# Patient Record
Sex: FEMALE | Race: WHITE | Employment: FULL TIME | URBAN - METROPOLITAN AREA
[De-identification: names, ages, dates, MRNs, and addresses within clinical notes are randomized per-mention and may not be internally consistent; named-entity substitution may affect disease eponyms.]

---

## 2021-07-08 ENCOUNTER — HOSPITAL ENCOUNTER (OUTPATIENT)
Age: 26
Discharge: HOME | End: 2021-07-08
Attending: OBSTETRICS & GYNECOLOGY | Admitting: OBSTETRICS & GYNECOLOGY
Payer: MEDICAID

## 2021-07-08 VITALS
HEIGHT: 66 IN | SYSTOLIC BLOOD PRESSURE: 115 MMHG | DIASTOLIC BLOOD PRESSURE: 71 MMHG | WEIGHT: 136 LBS | HEART RATE: 89 BPM | TEMPERATURE: 97.9 F | BODY MASS INDEX: 21.86 KG/M2

## 2021-07-08 PROBLEM — O47.00 PRETERM CONTRACTIONS: Status: ACTIVE | Noted: 2021-07-08

## 2021-07-08 LAB
APPEARANCE UR: CLEAR
BILIRUB UR QL: NEGATIVE
COLOR UR: YELLOW
FIBRONECTIN FETAL VAG QL: NEGATIVE
GLUCOSE UR QL STRIP.AUTO: NEGATIVE MG/DL
KETONES UR-MCNC: NEGATIVE MG/DL
LEUKOCYTE ESTERASE UR QL STRIP: NEGATIVE
NITRITE UR QL: NEGATIVE
PH UR: 7 [PH] (ref 5–9)
PROT UR QL: NEGATIVE MG/DL
RBC # UR STRIP: NEGATIVE /UL
SERVICE CMNT-IMP: ABNORMAL
SP GR UR: 1.02 (ref 1–1.02)
UROBILINOGEN UR QL: 0.2 EU/DL (ref 0.2–1)

## 2021-07-08 PROCEDURE — 59025 FETAL NON-STRESS TEST: CPT

## 2021-07-08 PROCEDURE — 81003 URINALYSIS AUTO W/O SCOPE: CPT

## 2021-07-08 PROCEDURE — 82731 ASSAY OF FETAL FIBRONECTIN: CPT

## 2021-07-08 RX ORDER — LANOLIN ALCOHOL/MO/W.PET/CERES
325 CREAM (GRAM) TOPICAL
COMMUNITY

## 2021-07-08 RX ORDER — INULIN/CHOLECALCIFEROL (D3) 2500MG-500
1 TABLET,CHEWABLE ORAL DAILY
COMMUNITY

## 2021-07-08 NOTE — PROGRESS NOTES
8952  @ 29.4 weeks arrived from office with FFN in hand for  ctxs. To bathroom to void, ambulated to bed and connected to EFM. 0 Dr. Pablo Amaral called, reviewed NST, discharge orders received. 1055 Discharge instructions given, pt verbalized understanding.

## 2021-07-30 ENCOUNTER — APPOINTMENT (OUTPATIENT)
Dept: PHYSICAL THERAPY | Age: 26
End: 2021-07-30
Payer: MEDICAID

## 2021-08-09 ENCOUNTER — APPOINTMENT (OUTPATIENT)
Dept: PHYSICAL THERAPY | Age: 26
End: 2021-08-09
Payer: MEDICAID

## 2021-08-10 ENCOUNTER — HOSPITAL ENCOUNTER (OUTPATIENT)
Dept: PHYSICAL THERAPY | Age: 26
Discharge: HOME | End: 2021-08-10
Payer: MEDICAID

## 2021-08-10 PROCEDURE — 97162 PT EVAL MOD COMPLEX 30 MIN: CPT

## 2021-08-10 NOTE — PROGRESS NOTES
PT DAILY TREATMENT NOTE/CERVICAL [id number]    Patient Name: Ike Manrique  Date:8/10/2021  : 1995  [x]  Patient  Verified  Payor: Latoya Branch / Plan: Goldy Spencer / Product Type: Managed Care Medicaid /    In time:4:30  Out time:5:15  Total Treatment Time (min): 45  Visit #: 1 of 12    Treatment Area: Neck pain [M54.2]  Right shoulder pain [M25.511]  Left shoulder pain [M25.512]    SUBJECTIVE  Pain Level (0-10 scale): 2  [x]constant []intermittent []improving []worsening []no change since onset    Any medication changes, allergies to medications, adverse drug reactions, diagnosis change, or new procedure performed?: [x] No    [] Yes (see summary sheet for update)  Subjective functional status/changes:     PLOF: spending time with family  Mechanism of Injury: 2 years with insidious onset. Pain in bilateral neck and shoulders. Current symptoms/Complaints: Describes pain as tender to touch. Denies numbness and tingling. 35 weeks 2nd baby, Emergency  with first with first baby 10mo. . Pain increases with touch, moving 8/10. Pain decreases to 2/10 propping up with pillows. Pain has increased some in third trimester. HA few times a week, sometimes due to pain. Previous Treatment/Compliance: went to therapy in the past with some relief, had to stop due to insurance. 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 \"    OBJECTIVE/EXAMINATION    21 min [x]Eval                  []Re-Eval        5 min Therapeutic Exercise:  [] See flow sheet : scap squeeze, QP cat/cow, standing IOTA, triceps extension with TB   Rationale: increase ROM, increase strength and increase proprioception to improve the patients ability to perform daily activities with decreased pain and symptom levels    7 min Therapeutic Activity:  []  See flow sheet : pt education on exam findings, diagnosis, posture, SI belt to help with pelvic pain   Rationale: increase strength, improve coordination and increase proprioception  to improve the patients ability to perform daily activities with decreased pain and symptom levels          With   [] TE   [] TA   [] neuro   [] other: Patient Education: [x] Review HEP    [] Progressed/Changed HEP based on:   [] positioning   [] body mechanics   [] transfers   [] heat/ice application    [] other:      Other Objective/Functional Measures:     OBJECTIVE  Posture: [] WNL  Head Position:  C-Kyphosis:  [] increased   [] decreased   C-Lordosis:   [] increased   [] decreased  T-Kyphosis:  [x] increased   [] decreased  T-Lordosis:   [] increased   [] decreased     Shoulder/Scapular Screen: right scapular wingling laterally       AROM               Strength    Right Left  Right  Left    Shoulder flexion  145* 160* 4 4   Shoulder * 180* 4 4   Biceps    5 5   Triceps    3+ 3+   Shoulder IR 65* 65* 5 5   Shoulder ER 90* 90*  4+ 4+   Rhomboids        Lower trap        Serratus          Active Movements: [] N/A   [] Too acute   [] Other:  Cervical  AROM PROM Comments:pain, area   Forward flexion 45*     Extension 45*     SB right 40*     SB left  40*     Rotation right 85*  Pulling on left    Rotation left 85*       Trunk rotation: right 14in, left 14in    Palpation:  [] Min  [x] Mod  [] Severe    Location: bilateral cervcial paraspinals, UT, subscap, lats, thoracic paraspinals     Neuro Screen (myotome/dematome/felexes): [] WNL  Myotome Level Muscle Test Myotome Level Muscle Test   C5 Shoulder Adduction - Deltoid C8 Finger Flexors   C6 Wrist Extension T1 Finger Abduction - Interossei   C7 Elbow Extension     Comments:  Upper Limb Tension Tests: [] N/A       Ulnar: [] R    [] L    [] +    [] -       Median: [] R    [] L    [] +    [] -       Radial: [] R    [] L    [] +    [] -    Special Tests:  Cervical:        Vertebral Artery:  [] R    [] L    [] +    [] - Alar Ligament: [] R    [] L    [] +    [] -       Spurling's:  [] R    [] L    [] +    [x] -       Distraction:  [] R    [] L    [] +    [] -       Compression: [] R    [] L    [] +    [x] -    Muscle Flexibility: [] N/A   Scalenes: [] WNL    [x] Tight    [x] R    [x] L   Upper Trap: [] WNL    [x] Tight    [x] R    [x] L   Levator: [] WNL    [x] Tight    [x] R    [x] L   Pect. Minor: [] WNL    [x] Tight    [x] R    [x] L         Other tests/comments:  UT compensation with reaching overhead     Pain Level (0-10 scale) post treatment: 4    ASSESSMENT/Changes in Function: Pt is a 21yo female that is 35 weeks pregnant with second child with close pregnancy with first presenting to therapy with c/c bilateral shoulder and neck pain with insidious onset 2 years ago. 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 Pt c/o pain that starts in neck and radiates down to the top of both shoulders however denies radicular symptoms. Pt describes pain as tender to the touch with knots with unable to tolerate massage from  due to pain. Pain increases to 8/10 with increased activity, reaching overhead, turning to right. Pain decreases to 2/10 with sitting with support and rest. Pt demonstrates WFL cervical ROM, WFL shoulder ROM bilaterally in all planes except right shoulder flexion and bilateral IR, WFL trunk rotation ROM, decreased bilateral triceps and scapular strength, negative cervcial compression, Spurlings and shoulder ligament tests, TTP over bilateral cervical and thoracic paraspinals, UT, subscap, lats and pecs, and  increased UT compensation and thoracic extesion with reaching overhead. Signs and symptoms consistent with mechanical neck/shoulder pain. Pt would benefit from skilled PT services to address the above impairments to allow pt to complete ADLs with increased ease.        Patient will continue to benefit from skilled PT services to modify and progress therapeutic interventions, address functional mobility deficits, address ROM deficits, address strength deficits, analyze and address soft tissue restrictions, analyze and cue movement patterns, analyze and modify body mechanics/ergonomics, assess and modify postural abnormalities and instruct in home and community integration to attain remaining goals. []  See Plan of Care  []  See progress note/recertification  []  See Discharge Summary         Progress towards goals / Updated goals:  Short Term Goals: STG- To be accomplished in 2 week(s):  1. Pt will be independent with HEP to encourage prophylaxis. Eval: HEP dispensed     Long Term Goals: LTG- To be accomplished in 6 week(s):  1. Pt will report >/=80% improvement in symptoms to be able to complete ADLs with increased ease. Eval: 8/10 max pain in neck and bilateral shoulder with activity    2. Pt right shoulder flexion ROM will improve to 160* and bilateral IR to > 80* to increase ease with ADLs such as dressing  Eval:145* right shoulder flexion with increased thoracic extension, 65* IR bilaterally    3. Pt bilateral triceps strength will improve to at least 4+/5 to allow pt to complte work duties and carry child with increased ease. Eval:3+/5 bilaterally     4. Pt FOTO score will increase by >/= 4 points to show improvement in subjective function.   Eval:65  Current: will address at visit 5       PLAN  []  Upgrade activities as tolerated     [x]  Continue plan of care  []  Update interventions per flow sheet       []  Discharge due to:_  []  Other:_      Victor Manuel Brock 8/10/2021  4:28 PM

## 2021-08-10 NOTE — PROGRESS NOTES
[facility] at the [address], [place], [address] [facility]  Phone: [phone]      Fax:  [phone]             Plan of Care/ Statement of Necessity for Physical Therapy Services    Patient name: Kayla Griffin Start of Care: 8/10/2021   Referral source: Alyne Nyhan, MD : 1995    Medical Diagnosis: Neck pain [M54.2]  Right shoulder pain [M25.511]  Left shoulder pain [M25.512]   Onset Date:2 years     Treatment Diagnosis: neck and bilateral shoulder pain    Prior Hospitalization: see medical history Provider#: [id number]   Medications: Verified on Patient summary List    Comorbidities: , close pregancies   Prior Level of Function able to complete ADLs and work duties with less pain      The Plan of Care and following information is based on the information from the initial evaluation. Assessment/ key information: Pt is a 21yo female that is 35 weeks pregnant with second child with close pregnancy with first presenting to therapy with c/c bilateral shoulder and neck pain with insidious onset 2 years ago. 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 Pt c/o pain that starts in neck and radiates down to the top of both shoulders however denies radicular symptoms. Pt describes pain as tender to the touch with knots with unable to tolerate massage from  due to pain. Pain increases to 8/10 with increased activity, reaching overhead, turning to right.  Pain decreases to 2/10 with sitting with support and rest. Pt demonstrates WFL cervical ROM, WFL shoulder ROM bilaterally in all planes except right shoulder flexion and bilateral IR, WFL trunk rotation ROM, decreased bilateral triceps and scapular strength, negative cervcial compression, Spurlings and shoulder ligament tests, TTP over bilateral cervical and thoracic paraspinals, UT, subscap, lats and pecs, and  increased UT compensation and thoracic extesion with reaching overhead. Signs and symptoms consistent with mechanical neck/shoulder pain. Pt would benefit from skilled PT services to address the above impairments to allow pt to complete ADLs with increased ease.      Evaluation Complexity History MEDIUM  Complexity : 1-2 comorbidities / personal factors will impact the outcome/ POC ; Examination MEDIUM Complexity : 3 Standardized tests and measures addressing body structure, function, activity limitation and / or participation in recreation  ;Presentation MEDIUM Complexity : Evolving with changing characteristics  ; Clinical Decision Making MEDIUM Complexity : FOTO score of 26-74 FOTO score = an established functional score where 100 = no disability  Overall Complexity Rating: MEDIUM  Problem List: pain affecting function, decrease ROM, decrease strength, impaired gait/ balance, decrease ADL/ functional abilitiies, decrease activity tolerance, decrease flexibility/ joint mobility and decrease transfer abilities   Treatment Plan may include any combination of the following: Therapeutic exercise, Therapeutic activities, Neuromuscular re-education, Physical agent/modality, Gait/balance training, Manual therapy, Patient education, Self Care training and Functional mobility training  Patient / Family readiness to learn indicated by: asking questions, trying to perform skills and interest  Persons(s) to be included in education: patient (P)  Barriers to Learning/Limitations: None  Patient Goal (s): get some relief, not so painful to touch. \"  Patient Self Reported Health Status: fair  Rehabilitation Potential: good    Short Term Goals: STG- To be accomplished in 2 week(s):  1. Pt will be independent with HEP to encourage prophylaxis. Eval: HEP dispensed      Long Term Goals: LTG- To be accomplished in 6 week(s):  1. Pt will report >/=80% improvement in symptoms to be able to complete ADLs with increased ease.    Eval: 8/10 max pain in neck and bilateral shoulder with activity     2. Pt right shoulder flexion ROM will improve to 160* and bilateral IR to > 80* to increase ease with ADLs such as dressing  Eval:145* right shoulder flexion with increased thoracic extension, 65* IR bilaterally     3. Pt bilateral triceps strength will improve to at least 4+/5 to allow pt to complte work duties and carry child with increased ease. Eval:3+/5 bilaterally      4. Pt FOTO score will increase by >/= 4 points to show improvement in subjective function. Eval:65  Current: will address at visit 5    Frequency / Duration: Patient to be seen 2 times per week for 6 weeks. Patient/ Caregiver education and instruction: Diagnosis, prognosis, self care, activity modification and exercises   [x]  Plan of care has been reviewed with LEROY CAUSEY Remesic 8/10/2021 6:30 PM  _____________________________________________________________________  I certify that the above Therapy Services are being furnished while the patient is under my care. I agree with the treatment plan and certify that this therapy is necessary.     Physician's Signature:____________Date:_________TIME:________                                      Valerie Oviedo MD    ** Signature, Date and Time must be completed for valid certification **    Please sign and return to [facility] at the [address], [address] [facility]       Phone: [phone]      Fax:  [phone]

## 2021-08-16 ENCOUNTER — HOSPITAL ENCOUNTER (OUTPATIENT)
Dept: PHYSICAL THERAPY | Age: 26
Discharge: HOME | End: 2021-08-16
Payer: MEDICAID

## 2021-08-16 PROCEDURE — 97110 THERAPEUTIC EXERCISES: CPT

## 2021-08-16 PROCEDURE — 97530 THERAPEUTIC ACTIVITIES: CPT

## 2021-08-16 PROCEDURE — 97112 NEUROMUSCULAR REEDUCATION: CPT

## 2021-08-16 NOTE — PROGRESS NOTES
PT DAILY TREATMENT NOTE    Patient Name: Angelia Serna  Date:2021  : 1995  [x]  Patient  Verified  Payor: Vergia Bence / Plan: Alba Chang / Product Type: Managed Care Medicaid /    In time:5:55  Out time:6:40  Total Treatment Time (min): 45  Total Timed Codes (min): 45  1:1 Treatment Time ( [address] only): 45   Visit #: 2 of 12    Treatment Dx: Neck pain [M54.2]  Right shoulder pain [M25.511]  Left shoulder pain [M25.512]    SUBJECTIVE  Pain Level (0-10 scale): 4  Any medication changes, allergies to medications, adverse drug reactions, diagnosis change, or new procedure performed?: [x] No    [] Yes (see summary sheet for update)  Subjective functional status/changes:   [] No changes reported  Pt reporting increased pain today. Pt reporting sorry she missed last appointment with thinking it was Friday instead of Thursday. OBJECTIVE      27 min Therapeutic Exercise:  [x] See flow sheet :   Rationale: increase ROM and increase strength to improve the patients ability to perform daily activities with decreased pain and symptom levels    8 min Therapeutic Activity:  []  See flow sheet : pt education on posture, SI belt use, PPT   Rationale: increase ROM, increase strength and increase proprioception  to improve the patients ability to perform daily activities with decreased pain and symptom levels    10 min Neuromuscular Re-education:  [x]?   See flow sheet : STM to left levator with breathing reclined supine   Rationale: improve coordination, improve balance and increase proprioception  to improve the patients ability to perform daily activities with decreased pain and symptom levels       With   [] TE   [] TA   [] neuro   [] other: Patient Education: [x] Review HEP    [] Progressed/Changed HEP based on:   [] positioning   [] body mechanics   [] transfers   [] heat/ice application    [] other:      Other Objective/Functional Measures:   Consistent cues for PPT with exercises   TTP left levator Pain Level (0-10 scale) post treatment: 2    ASSESSMENT/Changes in Function: Pt tolerated session well with reporting decreased pain post session. Pt tolerated exercises well with modifying as appropriate with good response to standing right reach with given as HEP today. Pt very challenged with PPT in standing and supine reclined needing tactile cues to assist.      Patient will continue to benefit from skilled PT services to modify and progress therapeutic interventions, address functional mobility deficits, address ROM deficits, address strength deficits, analyze and address soft tissue restrictions, analyze and cue movement patterns, analyze and modify body mechanics/ergonomics, assess and modify postural abnormalities and instruct in home and community integration to attain remaining goals. [x]  See Plan of Care  []  See progress note/recertification  []  See Discharge Summary         Progress towards goals / Updated goals:  Short Term Goals: STG- To be accomplished in 2 week(s):  1.  Pt will be independent with HEP to encourage prophylaxis. Eval: HEP dispensed   Current: compliance, updated today progressing 8/16/21     Long Term Goals: LTG- To be accomplished in 6 week(s):  1.  Pt will report >/=80% improvement in symptoms to be able to complete ADLs with increased ease. Eval: 8/10 max pain in neck and bilateral shoulder with activity     2.  Pt right shoulder flexion ROM will improve to 160* and bilateral IR to > 80* to increase ease with ADLs such as dressing  Eval:145* right shoulder flexion with increased thoracic extension, 65* IR bilaterally  Current:      3.  Pt bilateral triceps strength will improve to at least 4+/5 to allow pt to complte work duties and carry child with increased ease. Eval:3+/5 bilaterally      4.  Pt FOTO score will increase by >/= 4 points to show improvement in subjective function.   Eval:65  Current: will address at visit 5    PLAN  []  Upgrade activities as tolerated     [x]  Continue plan of care  []  Update interventions per flow sheet       []  Discharge due to:_  []  Other:_      Roc Yu 8/16/2021  6:11 PM    Future Appointments   Date Time Provider [place]   8/19/2021  6:00 PM Won Khoury PT, DPT [facility]   8/23/2021  6:00 PM RemtanvicBoston Pinch [facility]   8/25/2021  6:00 PM Yana Luna PT [facility]   8/30/2021  6:00 PM Remtanvic[place] Pinch [facility]   9/2/2021  6:00 PM Won Khoury PT, DPT [facility]   9/13/2021  6:00 PM Boston Soliz Pinch [facility]   9/16/2021  6:00 PM Won Khoury PT, DPT [facility]

## 2021-08-19 ENCOUNTER — HOSPITAL ENCOUNTER (OUTPATIENT)
Dept: PHYSICAL THERAPY | Age: 26
Discharge: HOME | End: 2021-08-19
Payer: MEDICAID

## 2021-08-19 PROCEDURE — 97110 THERAPEUTIC EXERCISES: CPT

## 2021-08-19 PROCEDURE — 97112 NEUROMUSCULAR REEDUCATION: CPT

## 2021-08-19 PROCEDURE — 97530 THERAPEUTIC ACTIVITIES: CPT

## 2021-08-19 NOTE — PROGRESS NOTES
PT DAILY TREATMENT NOTE    Patient Name: Ike Manrique  Date:2021  : 1995  [x]  Patient  Verified  Payor: Latoya Branch / Plan: Goldy Spencer / Product Type: Managed Care Medicaid /    In time:6:00 pm   Out time:6:49 pm   Total Treatment Time (min): 49  Total Timed Codes (min): 49  Visit #: 3 of 12    Treatment Dx: Neck pain [M54.2]  Right shoulder pain [M25.511]  Left shoulder pain [M25.512]    SUBJECTIVE  Pain Level (0-10 scale): 2  Any medication changes, allergies to medications, adverse drug reactions, diagnosis change, or new procedure performed?: [x] No    [] Yes (see summary sheet for update)  Subjective functional status/changes:   [] No changes reported  \"A lot better. \"    OBJECTIVE      15 min Therapeutic Exercise:  [x] See flow sheet :   Rationale: increase ROM, increase strength, improve coordination and increase proprioception to improve the patients ability to perform daily activities with decreased pain and symptom levels      14 min Therapeutic Activity:  [x]  See flow sheet :   Rationale: increase ROM, increase strength, improve coordination and increase proprioception  to improve the patients ability to perform daily activities with decreased pain and symptom levels       20 min Neuromuscular Re-education:  [x]  See flow sheet : included manual to SCMs and LS with T8 Extension    Rationale: increase ROM, increase strength, improve coordination and increase proprioception  to improve the patients ability to perform daily activities with decreased pain and symptom levels            With   [] TE   [] TA   [] neuro   [] other: Patient Education: [x] Review HEP    [] Progressed/Changed HEP based on:   [] positioning   [] body mechanics   [] transfers   [] heat/ice application    [] other:      Other Objective/Functional Measures:   Noted increased thickening at bilateral SCMs   Challenged with maintaining PPT with T8 Extension and pullovers      Pain Level (0-10 scale) post treatment: 2    ASSESSMENT/Changes in Function:   Pt appeared to respond well to new interventions this session. Challenged with T8 extension and pullovers. Reports that has scheduled  at 39 weeks. Patient will continue to benefit from skilled PT services to modify and progress therapeutic interventions, address functional mobility deficits, address ROM deficits, address strength deficits, analyze and address soft tissue restrictions, analyze and cue movement patterns, analyze and modify body mechanics/ergonomics, assess and modify postural abnormalities and instruct in home and community integration to attain remaining goals. [x]  See Plan of Care  []  See progress note/recertification  []  See Discharge Summary         Progress towards goals / Updated goals:  Short Term Goals: STG- To be accomplished in 2 week(s):  1.  Pt will be independent with HEP to encourage prophylaxis. Eval: HEP dispensed   Current: compliance, updated today progressing 21     Long Term Goals: LTG- To be accomplished in 6 week(s):  1.  Pt will report >/=80% improvement in symptoms to be able to complete ADLs with increased ease. Eval: 8/10 max pain in neck and bilateral shoulder with activity     2.  Pt right shoulder flexion ROM will improve to 160* and bilateral IR to > 80* to increase ease with ADLs such as dressing  Eval:145* right shoulder flexion with increased thoracic extension, 65* IR bilaterally  Current:      3.  Pt bilateral triceps strength will improve to at least 4+/5 to allow pt to complte work duties and carry child with increased ease. Eval:3+/5 bilaterally   Current: Progressing 21 visible fatigue  ER, Pullovers      4.  Pt FOTO score will increase by >/= 4 points to show improvement in subjective function.   Eval:65  Current: will address at visit 5    PLAN  [x]  Upgrade activities as tolerated     []  Continue plan of care  []  Update interventions per flow sheet       []  Discharge due to:_  []  Other:_      Ángela Paris PT, DPT 8/19/2021  6:27 PM    Future Appointments   Date Time Provider [place]   8/23/2021  6:00 PM Eyal Soliz [facility]   8/25/2021  6:00 PM Natacha Carrera PT [facility]   8/30/2021  6:00 PM Eyal Soliz [facility]   9/2/2021  6:00 PM Paulina Dominguez, PT, DPT [facility]   9/13/2021  6:00 PM Eyal Soliz [facility]   9/16/2021  6:00 PM Paulina Dominguez PT, DPT [facility]

## 2021-08-23 ENCOUNTER — APPOINTMENT (OUTPATIENT)
Dept: PHYSICAL THERAPY | Age: 26
End: 2021-08-23
Payer: MEDICAID

## 2021-08-25 ENCOUNTER — APPOINTMENT (OUTPATIENT)
Dept: PHYSICAL THERAPY | Age: 26
End: 2021-08-25
Payer: MEDICAID

## 2021-08-30 ENCOUNTER — HOSPITAL ENCOUNTER (OUTPATIENT)
Dept: PHYSICAL THERAPY | Age: 26
Discharge: HOME | End: 2021-08-30
Payer: MEDICAID

## 2021-08-30 PROCEDURE — 97112 NEUROMUSCULAR REEDUCATION: CPT

## 2021-08-30 PROCEDURE — 97530 THERAPEUTIC ACTIVITIES: CPT

## 2021-08-30 PROCEDURE — 97110 THERAPEUTIC EXERCISES: CPT

## 2021-08-30 NOTE — PROGRESS NOTES
PT DAILY TREATMENT NOTE    Patient Name: Sheng Goddard  Date:2021  : 1995  [x]  Patient  Verified  Payor: Terese Grier / Plan: [facility] MEDICAID / Product Type: Managed Care Medicaid /    In time:6:00  Out time:653  Total Treatment Time (min): 53  Total Timed Codes (min): 53  1:1 Treatment Time ( only): 48   Visit #: 4 of 12    Treatment Dx: Neck pain [M54.2]  Right shoulder pain [M25.511]  Left shoulder pain [M25.512]    SUBJECTIVE  Pain Level (0-10 scale): 5  Any medication changes, allergies to medications, adverse drug reactions, diagnosis change, or new procedure performed?: [x] No    [] Yes (see summary sheet for update)  Subjective functional status/changes:   [] No changes reported  Pt reporting some increased soreness in left shoulder and neck today. Pt reporting seeing OBGYN this Wednesday to schedule  possibly next week. OBJECTIVE    23 min Therapeutic Exercise:  [x]? See flow sheet :   Rationale: increase ROM, increase strength, improve coordination and increase proprioception to improve the patients ability to perform daily activities with decreased pain and symptom levels        15 min Therapeutic Activity:  [x]? See flow sheet :   Rationale: increase ROM, increase strength, improve coordination and increase proprioception  to improve the patients ability to perform daily activities with decreased pain and symptom levels        15 min Neuromuscular Re-education:  [x]?   See flow sheet : included manual to SCMs and LS with T8 Extension    Rationale: increase ROM, increase strength, improve coordination and increase proprioception  to improve the patients ability to perform daily activities with decreased pain and symptom levels          With   [] TE   [] TA   [] neuro   [] other: Patient Education: [x] Review HEP    [] Progressed/Changed HEP based on:   [] positioning   [] body mechanics   [] transfers   [] heat/ice application    [] other:      Other Objective/Functional Measures:    75* left  Shoulder IR ROM , 70* right IR, 152* shoulder flexion right     Pain Level (0-10 scale) post treatment: 2    ASSESSMENT/Changes in Function: Pt tolerated session well with reporting decreased pain post session. Pt continues to demonstrate forward head posture with cues to correct. Pt fatigued with wall slides and s/l ER due to decreased scapular strength. Improved bilateral shoulder ROM noted today suggesting improved rib mobility. Patient will continue to benefit from skilled PT services to modify and progress therapeutic interventions, address functional mobility deficits, address ROM deficits, address strength deficits, analyze and address soft tissue restrictions, analyze and cue movement patterns, analyze and modify body mechanics/ergonomics, assess and modify postural abnormalities and instruct in home and community integration to attain remaining goals. [x]  See Plan of Care  []  See progress note/recertification  []  See Discharge Summary         Progress towards goals / Updated goals:  Short Term Goals: STG- To be accomplished in 2 week(s):  1.  Pt will be independent with HEP to encourage prophylaxis. Eval: HEP dispensed   Current: compliance, updated today progressing 8/16/21     Long Term Goals: LTG- To be accomplished in 6 week(s):  1.  Pt will report >/=80% improvement in symptoms to be able to complete ADLs with increased ease. Eval: 8/10 max pain in neck and bilateral shoulder with activity     2.  Pt right shoulder flexion ROM will improve to 160* and bilateral IR to > 80* to increase ease with ADLs such as dressing  Eval:145* right shoulder flexion with increased thoracic extension, 65* IR bilaterally  Current: 75* left IR, 70* right IR, 152* flexion right progressing well 8/30/21     3.  Pt bilateral triceps strength will improve to at least 4+/5 to allow pt to complte work duties and carry child with increased ease.   Eval:3+/5 bilaterally   Current: Progressing 8/19/21 visible fatigue  ER, Pullovers      4.  Pt FOTO score will increase by >/= 4 points to show improvement in subjective function.   Eval:65  Current: will address at visit 5    PLAN  []  Upgrade activities as tolerated     [x]  Continue plan of care  []  Update interventions per flow sheet       []  Discharge due to:_  []  Other:_      Kizzy Byrd 8/30/2021  6:26 PM    Future Appointments   Date Time Provider Fletcher Barahona   9/2/2021  6:00 PM Susanna Allen, PT, DPT [id number]   9/13/2021  6:00 PM Servando Soliz [facility]   9/16/2021  6:00 PM Susanna Allen PT, DPT [id number]

## 2021-09-02 ENCOUNTER — APPOINTMENT (OUTPATIENT)
Dept: PHYSICAL THERAPY | Age: 26
End: 2021-09-02

## 2021-09-13 ENCOUNTER — TRANSCRIBE ORDER (OUTPATIENT)
Dept: REGISTRATION | Age: 26
End: 2021-09-13

## 2021-09-13 ENCOUNTER — APPOINTMENT (OUTPATIENT)
Dept: PHYSICAL THERAPY | Age: 26
End: 2021-09-13

## 2021-09-13 ENCOUNTER — ANESTHESIA EVENT (OUTPATIENT)
Dept: LABOR AND DELIVERY | Age: 26
DRG: 540 | End: 2021-09-13
Payer: MEDICAID

## 2021-09-13 ENCOUNTER — HOSPITAL ENCOUNTER (OUTPATIENT)
Dept: PREADMISSION TESTING | Age: 26
Discharge: HOME | DRG: 540 | End: 2021-09-13
Payer: MEDICAID

## 2021-09-13 DIAGNOSIS — Z98.891 HISTORY OF CESAREAN SECTION: Primary | ICD-10-CM

## 2021-09-13 LAB
ABO + RH BLD: NORMAL
BASOPHILS # BLD: 0 K/UL (ref 0–0.1)
BASOPHILS NFR BLD: 0 % (ref 0–2)
BLOOD GROUP ANTIBODIES SERPL: NORMAL
DIFFERENTIAL METHOD BLD: ABNORMAL
EOSINOPHIL # BLD: 0 K/UL (ref 0–0.4)
EOSINOPHIL NFR BLD: 0 % (ref 0–5)
ERYTHROCYTE [DISTWIDTH] IN BLOOD BY AUTOMATED COUNT: 13.8 % (ref 11.6–14.5)
HCT VFR BLD AUTO: 30.4 % (ref 35–45)
HGB BLD-MCNC: 9.8 G/DL (ref 12–16)
LYMPHOCYTES # BLD: 1.8 K/UL (ref 0.9–3.6)
LYMPHOCYTES NFR BLD: 20 % (ref 21–52)
MCH RBC QN AUTO: 29.2 PG (ref 24–34)
MCHC RBC AUTO-ENTMCNC: 32.2 G/DL (ref 31–37)
MCV RBC AUTO: 90.5 FL (ref 78–100)
MONOCYTES # BLD: 0.6 K/UL (ref 0.05–1.2)
MONOCYTES NFR BLD: 7 % (ref 3–10)
NEUTS SEG # BLD: 6.6 K/UL (ref 1.8–8)
NEUTS SEG NFR BLD: 73 % (ref 40–73)
PLATELET # BLD AUTO: 137 K/UL (ref 135–420)
PMV BLD AUTO: 11.3 FL (ref 9.2–11.8)
RBC # BLD AUTO: 3.36 M/UL (ref 4.2–5.3)
SPECIMEN EXP DATE BLD: NORMAL
WBC # BLD AUTO: 9.1 K/UL (ref 4.6–13.2)

## 2021-09-13 PROCEDURE — 85025 COMPLETE CBC W/AUTO DIFF WBC: CPT

## 2021-09-13 PROCEDURE — 86901 BLOOD TYPING SEROLOGIC RH(D): CPT

## 2021-09-13 PROCEDURE — 36415 COLL VENOUS BLD VENIPUNCTURE: CPT

## 2021-09-13 PROCEDURE — U0003 INFECTIOUS AGENT DETECTION BY NUCLEIC ACID (DNA OR RNA); SEVERE ACUTE RESPIRATORY SYNDROME CORONAVIRUS 2 (SARS-COV-2) (CORONAVIRUS DISEASE [COVID-19]), AMPLIFIED PROBE TECHNIQUE, MAKING USE OF HIGH THROUGHPUT TECHNOLOGIES AS DESCRIBED BY CMS-2020-01-R: HCPCS

## 2021-09-14 ENCOUNTER — HOSPITAL ENCOUNTER (INPATIENT)
Age: 26
LOS: 2 days | Discharge: HOME | DRG: 540 | End: 2021-09-16
Attending: OBSTETRICS & GYNECOLOGY | Admitting: OBSTETRICS & GYNECOLOGY
Payer: MEDICAID

## 2021-09-14 ENCOUNTER — ANESTHESIA (OUTPATIENT)
Dept: LABOR AND DELIVERY | Age: 26
DRG: 540 | End: 2021-09-14
Payer: MEDICAID

## 2021-09-14 DIAGNOSIS — Z98.891 PREVIOUS CESAREAN SECTION: Primary | ICD-10-CM

## 2021-09-14 PROBLEM — Z3A.39 39 WEEKS GESTATION OF PREGNANCY: Status: ACTIVE | Noted: 2021-09-14

## 2021-09-14 PROBLEM — Z33.1 IUP (INTRAUTERINE PREGNANCY), INCIDENTAL: Status: ACTIVE | Noted: 2021-09-14

## 2021-09-14 LAB — SARS-COV-2, NAA: NOT DETECTED

## 2021-09-14 PROCEDURE — 75410000003 HC RECOV DEL/VAG/CSECN EA 0.5 HR: Performed by: OBSTETRICS & GYNECOLOGY

## 2021-09-14 PROCEDURE — 76010000391 HC C SECN FIRST 1 HR: Performed by: OBSTETRICS & GYNECOLOGY

## 2021-09-14 PROCEDURE — 74011250636 HC RX REV CODE- 250/636: Performed by: ANESTHESIOLOGY

## 2021-09-14 PROCEDURE — 74011000250 HC RX REV CODE- 250: Performed by: ANESTHESIOLOGY

## 2021-09-14 PROCEDURE — 65270000029 HC RM PRIVATE

## 2021-09-14 PROCEDURE — 77030031139 HC SUT VCRL2 J&J -A: Performed by: OBSTETRICS & GYNECOLOGY

## 2021-09-14 PROCEDURE — 2709999900 HC NON-CHARGEABLE SUPPLY: Performed by: OBSTETRICS & GYNECOLOGY

## 2021-09-14 PROCEDURE — 77030018831 HC SOL IRR H20 BAXT -A: Performed by: OBSTETRICS & GYNECOLOGY

## 2021-09-14 PROCEDURE — 77030040361 HC SLV COMPR DVT MDII -B: Performed by: OBSTETRICS & GYNECOLOGY

## 2021-09-14 PROCEDURE — 77030014144 HC TY SPN ANES BBMI -B: Performed by: ANESTHESIOLOGY

## 2021-09-14 PROCEDURE — 74011000250 HC RX REV CODE- 250: Performed by: NURSE ANESTHETIST, CERTIFIED REGISTERED

## 2021-09-14 PROCEDURE — 77030008459 HC STPLR SKN COOP -B: Performed by: OBSTETRICS & GYNECOLOGY

## 2021-09-14 PROCEDURE — 77030011265 HC ELECTRD BLD HEX COVD -A: Performed by: OBSTETRICS & GYNECOLOGY

## 2021-09-14 PROCEDURE — 74011250636 HC RX REV CODE- 250/636: Performed by: OBSTETRICS & GYNECOLOGY

## 2021-09-14 PROCEDURE — 76060000032 HC ANESTHESIA 0.5 TO 1 HR: Performed by: OBSTETRICS & GYNECOLOGY

## 2021-09-14 PROCEDURE — 74011250637 HC RX REV CODE- 250/637

## 2021-09-14 PROCEDURE — 74011250636 HC RX REV CODE- 250/636: Performed by: NURSE ANESTHETIST, CERTIFIED REGISTERED

## 2021-09-14 RX ORDER — CEFAZOLIN SODIUM/WATER 2 G/20 ML
2 SYRINGE (ML) INTRAVENOUS ONCE
Status: DISCONTINUED | OUTPATIENT
Start: 2021-09-14 | End: 2021-09-14

## 2021-09-14 RX ORDER — DIPHENHYDRAMINE HYDROCHLORIDE 50 MG/ML
12.5 INJECTION, SOLUTION INTRAMUSCULAR; INTRAVENOUS
Status: DISCONTINUED | OUTPATIENT
Start: 2021-09-14 | End: 2021-09-14 | Stop reason: SDUPTHER

## 2021-09-14 RX ORDER — SODIUM CHLORIDE 0.9 % (FLUSH) 0.9 %
5-40 SYRINGE (ML) INJECTION EVERY 8 HOURS
Status: CANCELLED | OUTPATIENT
Start: 2021-09-14

## 2021-09-14 RX ORDER — ONDANSETRON 2 MG/ML
INJECTION INTRAMUSCULAR; INTRAVENOUS AS NEEDED
Status: DISCONTINUED | OUTPATIENT
Start: 2021-09-14 | End: 2021-09-14 | Stop reason: HOSPADM

## 2021-09-14 RX ORDER — NALOXONE HYDROCHLORIDE 0.4 MG/ML
0.2 INJECTION, SOLUTION INTRAMUSCULAR; INTRAVENOUS; SUBCUTANEOUS AS NEEDED
Status: DISCONTINUED | OUTPATIENT
Start: 2021-09-14 | End: 2021-09-16 | Stop reason: HOSPADM

## 2021-09-14 RX ORDER — EPHEDRINE SULFATE/0.9% NACL/PF 50 MG/5 ML
SYRINGE (ML) INTRAVENOUS AS NEEDED
Status: DISCONTINUED | OUTPATIENT
Start: 2021-09-14 | End: 2021-09-14 | Stop reason: HOSPADM

## 2021-09-14 RX ORDER — ALBUTEROL SULFATE 0.83 MG/ML
2.5 SOLUTION RESPIRATORY (INHALATION) AS NEEDED
Status: DISCONTINUED | OUTPATIENT
Start: 2021-09-14 | End: 2021-09-16 | Stop reason: HOSPADM

## 2021-09-14 RX ORDER — KETOROLAC TROMETHAMINE 30 MG/ML
30 INJECTION, SOLUTION INTRAMUSCULAR; INTRAVENOUS
Status: DISCONTINUED | OUTPATIENT
Start: 2021-09-14 | End: 2021-09-14 | Stop reason: SDUPTHER

## 2021-09-14 RX ORDER — FACIAL-BODY WIPES
10 EACH TOPICAL
Status: DISCONTINUED | OUTPATIENT
Start: 2021-09-14 | End: 2021-09-16 | Stop reason: HOSPADM

## 2021-09-14 RX ORDER — KETOROLAC TROMETHAMINE 30 MG/ML
30 INJECTION, SOLUTION INTRAMUSCULAR; INTRAVENOUS EVERY 6 HOURS
Status: COMPLETED | OUTPATIENT
Start: 2021-09-14 | End: 2021-09-16

## 2021-09-14 RX ORDER — ZOLPIDEM TARTRATE 5 MG/1
5 TABLET ORAL
Status: DISCONTINUED | OUTPATIENT
Start: 2021-09-14 | End: 2021-09-16 | Stop reason: HOSPADM

## 2021-09-14 RX ORDER — OXYTOCIN/0.9 % SODIUM CHLORIDE 30/500 ML
87.3 PLASTIC BAG, INJECTION (ML) INTRAVENOUS AS NEEDED
Status: COMPLETED | OUTPATIENT
Start: 2021-09-14 | End: 2021-09-14

## 2021-09-14 RX ORDER — ACETAMINOPHEN 325 MG/1
650 TABLET ORAL
Status: DISCONTINUED | OUTPATIENT
Start: 2021-09-14 | End: 2021-09-16 | Stop reason: HOSPADM

## 2021-09-14 RX ORDER — FLUMAZENIL 0.1 MG/ML
0.2 INJECTION INTRAVENOUS
Status: DISCONTINUED | OUTPATIENT
Start: 2021-09-14 | End: 2021-09-16 | Stop reason: HOSPADM

## 2021-09-14 RX ORDER — MISOPROSTOL 200 UG/1
800 TABLET ORAL ONCE
Status: COMPLETED | OUTPATIENT
Start: 2021-09-14 | End: 2021-09-14

## 2021-09-14 RX ORDER — IBUPROFEN 400 MG/1
800 TABLET ORAL
Status: DISCONTINUED | OUTPATIENT
Start: 2021-09-17 | End: 2021-09-16 | Stop reason: HOSPADM

## 2021-09-14 RX ORDER — SODIUM CHLORIDE, SODIUM LACTATE, POTASSIUM CHLORIDE, CALCIUM CHLORIDE 600; 310; 30; 20 MG/100ML; MG/100ML; MG/100ML; MG/100ML
125 INJECTION, SOLUTION INTRAVENOUS CONTINUOUS
Status: DISCONTINUED | OUTPATIENT
Start: 2021-09-14 | End: 2021-09-14 | Stop reason: HOSPADM

## 2021-09-14 RX ORDER — SIMETHICONE 80 MG
80 TABLET,CHEWABLE ORAL
Status: DISCONTINUED | OUTPATIENT
Start: 2021-09-14 | End: 2021-09-16 | Stop reason: HOSPADM

## 2021-09-14 RX ORDER — OXYTOCIN/RINGER'S LACTATE 30/500 ML
10 PLASTIC BAG, INJECTION (ML) INTRAVENOUS AS NEEDED
Status: DISCONTINUED | OUTPATIENT
Start: 2021-09-14 | End: 2021-09-16 | Stop reason: HOSPADM

## 2021-09-14 RX ORDER — SODIUM CHLORIDE 0.9 % (FLUSH) 0.9 %
5-40 SYRINGE (ML) INJECTION AS NEEDED
Status: CANCELLED | OUTPATIENT
Start: 2021-09-14

## 2021-09-14 RX ORDER — OXYCODONE AND ACETAMINOPHEN 5; 325 MG/1; MG/1
1 TABLET ORAL AS NEEDED
Status: DISCONTINUED | OUTPATIENT
Start: 2021-09-14 | End: 2021-09-14 | Stop reason: SDUPTHER

## 2021-09-14 RX ORDER — DIPHENHYDRAMINE HYDROCHLORIDE 50 MG/ML
12.5 INJECTION, SOLUTION INTRAMUSCULAR; INTRAVENOUS
Status: DISCONTINUED | OUTPATIENT
Start: 2021-09-14 | End: 2021-09-16 | Stop reason: HOSPADM

## 2021-09-14 RX ORDER — CEFAZOLIN SODIUM/WATER 2 G/20 ML
2 SYRINGE (ML) INTRAVENOUS ONCE
Status: COMPLETED | OUTPATIENT
Start: 2021-09-14 | End: 2021-09-14

## 2021-09-14 RX ORDER — OXYCODONE AND ACETAMINOPHEN 5; 325 MG/1; MG/1
1-2 TABLET ORAL
Status: DISCONTINUED | OUTPATIENT
Start: 2021-09-14 | End: 2021-09-16 | Stop reason: HOSPADM

## 2021-09-14 RX ORDER — SODIUM CHLORIDE, SODIUM LACTATE, POTASSIUM CHLORIDE, CALCIUM CHLORIDE 600; 310; 30; 20 MG/100ML; MG/100ML; MG/100ML; MG/100ML
1000 INJECTION, SOLUTION INTRAVENOUS CONTINUOUS
Status: DISCONTINUED | OUTPATIENT
Start: 2021-09-14 | End: 2021-09-16 | Stop reason: HOSPADM

## 2021-09-14 RX ORDER — PROMETHAZINE HYDROCHLORIDE 25 MG/ML
25 INJECTION, SOLUTION INTRAMUSCULAR; INTRAVENOUS
Status: DISCONTINUED | OUTPATIENT
Start: 2021-09-14 | End: 2021-09-14 | Stop reason: CLARIF

## 2021-09-14 RX ORDER — OXYTOCIN/RINGER'S LACTATE 30/500 ML
87.3 PLASTIC BAG, INJECTION (ML) INTRAVENOUS AS NEEDED
Status: COMPLETED | OUTPATIENT
Start: 2021-09-14 | End: 2021-09-14

## 2021-09-14 RX ORDER — BUPIVACAINE HYDROCHLORIDE 7.5 MG/ML
INJECTION, SOLUTION EPIDURAL; RETROBULBAR
Status: SHIPPED | OUTPATIENT
Start: 2021-09-14 | End: 2021-09-14

## 2021-09-14 RX ORDER — MISOPROSTOL 200 UG/1
TABLET ORAL
Status: COMPLETED
Start: 2021-09-14 | End: 2021-09-14

## 2021-09-14 RX ORDER — KETOROLAC TROMETHAMINE 30 MG/ML
30 INJECTION, SOLUTION INTRAMUSCULAR; INTRAVENOUS EVERY 6 HOURS
Status: DISCONTINUED | OUTPATIENT
Start: 2021-09-14 | End: 2021-09-14

## 2021-09-14 RX ORDER — HYDROMORPHONE HYDROCHLORIDE 1 MG/ML
0.5 INJECTION, SOLUTION INTRAMUSCULAR; INTRAVENOUS; SUBCUTANEOUS
Status: DISCONTINUED | OUTPATIENT
Start: 2021-09-14 | End: 2021-09-16 | Stop reason: HOSPADM

## 2021-09-14 RX ORDER — FENTANYL CITRATE 50 UG/ML
25 INJECTION, SOLUTION INTRAMUSCULAR; INTRAVENOUS AS NEEDED
Status: DISCONTINUED | OUTPATIENT
Start: 2021-09-14 | End: 2021-09-16 | Stop reason: HOSPADM

## 2021-09-14 RX ORDER — OXYTOCIN/0.9 % SODIUM CHLORIDE 30/500 ML
PLASTIC BAG, INJECTION (ML) INTRAVENOUS
Status: DISPENSED
Start: 2021-09-14 | End: 2021-09-14

## 2021-09-14 RX ORDER — MORPHINE SULFATE 1 MG/ML
INJECTION, SOLUTION EPIDURAL; INTRATHECAL; INTRAVENOUS
Status: SHIPPED | OUTPATIENT
Start: 2021-09-14 | End: 2021-09-14

## 2021-09-14 RX ORDER — MORPHINE SULFATE 1 MG/ML
0.2 INJECTION, SOLUTION EPIDURAL; INTRATHECAL; INTRAVENOUS ONCE
Status: CANCELLED | OUTPATIENT
Start: 2021-09-14 | End: 2021-09-14

## 2021-09-14 RX ORDER — HYDROMORPHONE HYDROCHLORIDE 2 MG/1
2 TABLET ORAL
Status: ACTIVE | OUTPATIENT
Start: 2021-09-14 | End: 2021-09-15

## 2021-09-14 RX ORDER — SODIUM CHLORIDE, SODIUM LACTATE, POTASSIUM CHLORIDE, CALCIUM CHLORIDE 600; 310; 30; 20 MG/100ML; MG/100ML; MG/100ML; MG/100ML
125 INJECTION, SOLUTION INTRAVENOUS CONTINUOUS
Status: DISPENSED | OUTPATIENT
Start: 2021-09-14 | End: 2021-09-15

## 2021-09-14 RX ORDER — ONDANSETRON 2 MG/ML
4 INJECTION INTRAMUSCULAR; INTRAVENOUS
Status: DISCONTINUED | OUTPATIENT
Start: 2021-09-14 | End: 2021-09-16 | Stop reason: HOSPADM

## 2021-09-14 RX ORDER — KETOROLAC TROMETHAMINE 15 MG/ML
INJECTION, SOLUTION INTRAMUSCULAR; INTRAVENOUS AS NEEDED
Status: DISCONTINUED | OUTPATIENT
Start: 2021-09-14 | End: 2021-09-14 | Stop reason: HOSPADM

## 2021-09-14 RX ADMIN — SODIUM CHLORIDE, SODIUM LACTATE, POTASSIUM CHLORIDE, AND CALCIUM CHLORIDE: 600; 310; 30; 20 INJECTION, SOLUTION INTRAVENOUS at 10:35

## 2021-09-14 RX ADMIN — OXYTOCIN 250 MILLI-UNITS/MIN: 10 INJECTION, SOLUTION INTRAMUSCULAR; INTRAVENOUS at 10:39

## 2021-09-14 RX ADMIN — MISOPROSTOL 800 MCG: 200 TABLET ORAL at 13:33

## 2021-09-14 RX ADMIN — BUPIVACAINE HYDROCHLORIDE 12 MG: 7.5 INJECTION, SOLUTION EPIDURAL; RETROBULBAR at 10:25

## 2021-09-14 RX ADMIN — ONDANSETRON 4 MG: 2 INJECTION INTRAMUSCULAR; INTRAVENOUS at 15:29

## 2021-09-14 RX ADMIN — SODIUM CHLORIDE, SODIUM LACTATE, POTASSIUM CHLORIDE, AND CALCIUM CHLORIDE 125 ML/HR: 600; 310; 30; 20 INJECTION, SOLUTION INTRAVENOUS at 13:13

## 2021-09-14 RX ADMIN — Medication 87.3 MILLI-UNITS/MIN: at 13:23

## 2021-09-14 RX ADMIN — Medication 10 MG: at 10:34

## 2021-09-14 RX ADMIN — KETOROLAC TROMETHAMINE 30 MG: 15 INJECTION, SOLUTION INTRAMUSCULAR; INTRAVENOUS at 10:50

## 2021-09-14 RX ADMIN — Medication 10 MG: at 10:49

## 2021-09-14 RX ADMIN — MORPHINE SULFATE 0.2 MG: 1 INJECTION, SOLUTION EPIDURAL; INTRATHECAL; INTRAVENOUS at 10:25

## 2021-09-14 RX ADMIN — SODIUM CHLORIDE, SODIUM LACTATE, POTASSIUM CHLORIDE, AND CALCIUM CHLORIDE 1000 ML: 600; 310; 30; 20 INJECTION, SOLUTION INTRAVENOUS at 08:06

## 2021-09-14 RX ADMIN — KETOROLAC TROMETHAMINE 30 MG: 30 INJECTION, SOLUTION INTRAMUSCULAR; INTRAVENOUS at 17:10

## 2021-09-14 RX ADMIN — CEFAZOLIN 2 G: 1 INJECTION, POWDER, FOR SOLUTION INTRAVENOUS at 10:22

## 2021-09-14 RX ADMIN — ONDANSETRON HYDROCHLORIDE 4 MG: 2 INJECTION INTRAMUSCULAR; INTRAVENOUS at 10:49

## 2021-09-14 RX ADMIN — SODIUM CHLORIDE, SODIUM LACTATE, POTASSIUM CHLORIDE, AND CALCIUM CHLORIDE 125 ML/HR: 600; 310; 30; 20 INJECTION, SOLUTION INTRAVENOUS at 08:37

## 2021-09-14 NOTE — OP NOTES
Section Delivery Procedure Note    Name: Merry Waters   Medical Record Number: [id number]   YOB: 1995  Today's Date: 2021      Preoperative Diagnosis: HISTORY OF     Postoperative Diagnosis: same    Procedure: Low Cervical Transverse Procedure(s):  REPEAT  SECTION    Surgeon(s):  Chikis Calixto MD    Assistants: Circ-1: Nora Salmeron RN  Scrub Tech-1: Mili Avery  Scrub Tech-2: Prasanna Forte     Assistants Tasks:   Retraction        Anesthesia:spinal    Prophylactic Antibiotics: Ancef pre-op  pre-delivery 1 doses. Procedure Details:    Patient was induced under spinal anesthetic, placed supine, pillow under right hip, scrubbed and draped. She was checked for adequate anesthesia. Pfannenstiel incision was made in the skin, extended through the subcutaneous tissue and the anterior rectus sheath. The rectus muscles were  and a longitudinal incision was made in the parietal peritoneum preserving the bladder. Bladder blade was positioned. Transverse incision is made in the lower segment of the uterus after first carefully locating the position of the lower segment. Baby is delivered through this incision. No traction is placed upon the fetal head during this delivery. cord is clamped and cut. Baby is handed off to waiting pediatric expert, after initial resuscitation with bulb suction while we waited for the cord to be clamped and cut. Placenta is now delivered manually, and the uterus was delivered out of the pelvic cavity  Uterine cavity is carefully cleaned with a dry pack and inspected to be sure that it is empty. Myometrium is closed in two layers the first running locking and the second running imbricating. A further suture is placed in the serosa hold the incision together and achieve hemostasis and close the serosa. Once we were sure that hemostasis had been achieved, the uterus was returned to the peritoneal cavity.   Careful hemostasis is achieved in the rectus sheath, and this is closed with a running suture. Careful hemostasis is obtained with cautery in the subcutaneous tissue and the skin is closed with ensorb absorbable staples. Sterile dressing is applied, patient is recovered and sent to recovery room in good condition. .  Note: all sutures used throughout were number 1 Vicryl.         Estimated Blood Aouo556 subjective 1300 per qbl per nurse  Replacement: 1400cc    Fetal Description: gauthier male    Apgar - One Minute: 9    Apgar - Five Minutes: 9    Umbilical Cord: 3 vessels present             Cord Blood Results:   Information for the patient's :  Zach Cuellar [[id number]]   No results found for: [address], [place], [address], [address], [place], [place]          Birth Information:   Information for the patient's :  Zach Cuellar [[id number]]          Specimens: none        Maternal Findings    Uterus Size: normal, Fibroids: no , Adhesions: {None, Anomalies: None Defects: no   Tubes normal   Ovaries normal   Abdomen Adhesions: None                Complications:  none             Mother's Condition: good  Baby's Condition: good    Signed: Jaqueline Osborn MD      2021

## 2021-09-14 NOTE — PROGRESS NOTES
9060- Pt arrived to L&D for scheduled repeat , G2,P1, 39.2 weeks. Pt of Dr. Kelsey Cooper MD. Pt denies VB or LOF. Pt reports positive fetal movement. 8746- Abdomen soft to palpation and non-tender. US and TOCO applied. 6167-  delayed due to Dr. Kelsey Cooper MD in another case. BreezyMariselaTena- Dr. Kelsey Cooper MD at bedside. 1009- Dr. Evelia Hong MD at bedside. 1016- Pt ambulated to  OR2 accompanied by this RN for scheduled . 1105- Pt transported to  BR1 via stretcher accompanied by this RN and DILLON Peck CRNA status post repeat . 1110- Report received from DILLON Sewell CRNA. Pt in stable condition. 1115- Pt provided with, instructed on, and encouraged to use incentive spirometer, educated on use, benefits, and frequency of incentive spirometer. Successful return demonstration performed by pt. Verbalizes understanding of education. 12- Dr. Kelsey Cooper MD called to notify of heavy bleeding. 1333- 800mcg rectal Cytotec administered. 1435- TRANSFER - OUT REPORT:  Bedside and verbal report given to KAMLA Banuelos LPN on Thakur Maynor  being transferred to Mother Baby for routine progression of care     Report consisted of patients Situation, Background, Assessment and   Recommendations(SBAR). Information from the following report(s) SBAR, OR Summary, Procedure Summary, Intake/Output, MAR and Recent Results was reviewed with the receiving nurse. Lines:   Peripheral IV 21 Right Forearm (Active)   Site Assessment Clean, dry, & intact 21 1312   Phlebitis Assessment 0 21 1312   Infiltration Assessment 0 21 1312   Dressing Status Clean, dry, & intact 21 1312   Dressing Type Tape;Transparent 21 0755   Hub Color/Line Status Infusing 21 1312   Action Taken Open ports on tubing capped 21 0755   Alcohol Cap Used Yes 21 0755    Opportunity for questions and clarification was provided.     Patient transported with:   Registered Nurse  [facility]

## 2021-09-14 NOTE — LACTATION NOTE
Infant latched and nursing well at 60 [phone] 25 with nipple shield. Infant was unable to nurse without shield and upon finger assessment, baby clamps down and has higher palate requiring nipple shield use for now. Discussed with mom to attempt each feeding without nipple shield first. Mom can also easily express colostrum and nipple shield was full each time infant came off breast. Mom educated on breastfeeding basics--hunger cues, feeding on demand, waking baby if baby sleeps too long between feeds, importance of skin to skin, positioning and latching, risk of pacifier use and supplemental feedings, and importance of rooming in--and use of log sheet. Mom also educated on benefits of breastfeeding for herself and baby. Mom verbalized understanding. No questions at this time. Updated Alexis Meadows RN on feed.

## 2021-09-14 NOTE — H&P
Ostetrical History and Physical    Subjective:     Date of Admission: 2021    Patient is a 27 y.o.  female admitted with repeat  section at term gbs pos. For Obstetric history, see phil.    Past Medical History:   Diagnosis Date    Anemia     Breast disorder       Past Surgical History:   Procedure Laterality Date    HX  SECTION        Prior to Admission medications    Medication Sig Start Date End Date Taking? Authorizing Provider   prenatal vit/iron fum/folic ac (PRENATAL 1+1 PO) Take 1 Tablet by mouth daily. Yes Provider, Historical   inulin-vitamin D3 (Fiber Gummies with Vitamin D3) 2,500 mg- 500 unit chew Take 1 Tablet by mouth daily. Yes Provider, Historical   ferrous sulfate 325 mg (65 mg iron) tablet Take 325 mg by mouth Daily (before breakfast). Yes Provider, Historical     Allergies   Allergen Reactions    Aloe Rash      Social History     Tobacco Use    Smoking status: Never Smoker    Smokeless tobacco: Never Used   Substance Use Topics    Alcohol use: Never      History reviewed. No pertinent family history. Review of Systems    Objective:     Blood pressure 137/79, pulse (!) 102, temperature 97.6 °F (36.4 °C), resp. rate 18, height 5' 6\" (1.676 m), weight 63.5 kg (140 lb), SpO2 100 %. Temp (24hrs), Av.6 °F (36.4 °C), Min:97.6 °F (36.4 °C), Max:97.6 °F (36.4 °C)        No intake/output data recorded. No intake/output data recorded.     @[email or web address]    Pelvic: deferred  Recent Results (from the past 24 hour(s))   TYPE & SCREEN    Collection Time: 21 10:29 AM   Result Value Ref Range    Crossmatch Expiration 2021,2359     ABO/Rh(D) Shakira Johanna POSITIVE     Antibody screen NEG    CBC WITH AUTOMATED DIFF    Collection Time: 21 10:29 AM   Result Value Ref Range    WBC 9.1 4.6 - 13.2 K/uL    RBC 3.36 (L) 4.20 - 5.30 M/uL    HGB 9.8 (L) 12.0 - 16.0 g/dL    HCT 30.4 (L) 35.0 - 45.0 %    MCV 90.5 78.0 - 100.0 FL    MCH 29.2 24.0 - 34.0 PG    MCHC 32.2 31.0 - 37.0 g/dL    RDW 13.8 11.6 - 14.5 %    PLATELET 968 972 - 417 K/uL    MPV 11.3 9.2 - 11.8 FL    NEUTROPHILS 73 40 - 73 %    LYMPHOCYTES 20 (L) 21 - 52 %    MONOCYTES 7 3 - 10 %    EOSINOPHILS 0 0 - 5 %    BASOPHILS 0 0 - 2 %    ABS. NEUTROPHILS 6.6 1.8 - 8.0 K/UL    ABS. LYMPHOCYTES 1.8 0.9 - 3.6 K/UL    ABS. MONOCYTES 0.6 0.05 - 1.2 K/UL    ABS. EOSINOPHILS 0.0 0.0 - 0.4 K/UL    ABS.  BASOPHILS 0.0 0.0 - 0.1 K/UL    DF AUTOMATED     SARS-COV-2 BY KAELA    Collection Time: 21 11:00 AM   Result Value Ref Range    SARS-CoV-2, KAELA Not detected NOTD       Monitor:  Reactivity:present Variability:present Baseline:within normal limits    Assessment:     Principal Problem:    Previous  section (2021)    Active Problems:    39 weeks gestation of pregnancy (2021)      IUP (intrauterine pregnancy), incidental (2021)        Plan:     Prophylaxis:  Deep Vein Thrombosis Protocol Active:Yes    Check labs:    Check  Prenatal:    Disposition    Total time spent with patient:In-Patient    Signed By: Mandi Adkins MD                         2021

## 2021-09-14 NOTE — ANESTHESIA PROCEDURE NOTES
Spinal Block    Start time: 9/14/2021 10:22 AM  End time: 9/14/2021 10:26 AM  Performed by: Karly Edwards MD  Authorized by: Karly Edwards MD     Pre-procedure:   Indications: primary anesthetic  Preanesthetic Checklist: patient identified, risks and benefits discussed, anesthesia consent, site marked, patient being monitored and timeout performed    Timeout Time: 10:22 EDT          Spinal Block:   Patient Position:  Seated  Prep Region:  Lumbar  Prep: chlorhexidine      Location:  L4-5  Technique:  Single shot        Needle:   Needle Type:  Pencan  Needle Gauge:  25 G  Attempts:  1      Events: CSF confirmed, no blood with aspiration and no paresthesia        Assessment:  Insertion:  Uncomplicated  Patient tolerance:  Patient tolerated the procedure well with no immediate complications

## 2021-09-14 NOTE — PROGRESS NOTES
Problem: General Medical Care Plan  Goal: *Vital signs within specified parameters  Outcome: Progressing Towards Goal  Goal: *Labs within defined limits  Outcome: Progressing Towards Goal  Goal: *Absence of infection signs and symptoms  Outcome: Progressing Towards Goal  Goal: *Optimal pain control at patient's stated goal  Outcome: Progressing Towards Goal  Goal: *Skin integrity maintained  Outcome: Progressing Towards Goal  Goal: *Fluid volume balance  Outcome: Progressing Towards Goal  Goal: *Optimize nutritional status  Outcome: Progressing Towards Goal  Goal: *Anxiety reduced or absent  Outcome: Progressing Towards Goal  Goal: *Progressive mobility and function (eg: ADL's)  Outcome: Progressing Towards Goal     Problem: Patient Education: Go to Patient Education Activity  Goal: Patient/Family Education  Outcome: Progressing Towards Goal     Problem: Pain  Goal: *Control of Pain  Outcome: Progressing Towards Goal

## 2021-09-14 NOTE — PROGRESS NOTES
Problem: General Medical Care Plan  Goal: *Vital signs within specified parameters  Outcome: Progressing Towards Goal  Goal: *Labs within defined limits  Outcome: Progressing Towards Goal  Goal: *Absence of infection signs and symptoms  Outcome: Progressing Towards Goal  Goal: *Optimal pain control at patient's stated goal  Outcome: Progressing Towards Goal  Goal: *Skin integrity maintained  Outcome: Progressing Towards Goal  Goal: *Fluid volume balance  Outcome: Progressing Towards Goal  Goal: *Optimize nutritional status  Outcome: Progressing Towards Goal  Goal: *Anxiety reduced or absent  Outcome: Progressing Towards Goal  Goal: *Progressive mobility and function (eg: ADL's)  Outcome: Progressing Towards Goal     Problem: Patient Education: Go to Patient Education Activity  Goal: Patient/Family Education  Outcome: Progressing Towards Goal     Problem: Pain  Goal: *Control of Pain  Outcome: Progressing Towards Goal     Problem: Pressure Injury - Risk of  Goal: *Prevention of pressure injury  Description: Document Chinedu Scale and appropriate interventions in the flowsheet.   Outcome: Progressing Towards Goal  Note: Pressure Injury Interventions:                                            Problem: Patient Education: Go to Patient Education Activity  Goal: Patient/Family Education  Outcome: Progressing Towards Goal     Problem:  Delivery: Day of Delivery  Goal: Activity/Safety  Outcome: Progressing Towards Goal  Goal: Consults, if ordered  Outcome: Progressing Towards Goal  Goal: Diagnostic Test/Procedures  Outcome: Progressing Towards Goal  Goal: Nutrition/Diet  Outcome: Progressing Towards Goal  Goal: Discharge Planning  Outcome: Progressing Towards Goal  Goal: Medications  Outcome: Progressing Towards Goal  Goal: Respiratory  Outcome: Progressing Towards Goal  Goal: Treatments/Interventions/Procedures  Outcome: Progressing Towards Goal  Goal: Psychosocial  Outcome: Progressing Towards Goal  Goal: *Vital signs within defined limits  Outcome: Progressing Towards Goal  Goal: *Labs within defined limits  Outcome: Progressing Towards Goal  Goal: *Hemodynamically stable  Outcome: Progressing Towards Goal  Goal: *Optimal pain control at patient's stated goal  Outcome: Progressing Towards Goal  Goal: *Participates in infant care  Outcome: Progressing Towards Goal  Goal: *Demonstrates progressive activity  Outcome: Progressing Towards Goal  Goal: *Tolerating diet  Outcome: Progressing Towards Goal

## 2021-09-14 NOTE — ANESTHESIA PREPROCEDURE EVALUATION
Relevant Problems   No relevant active problems       Anesthetic History   No history of anesthetic complications            Review of Systems / Medical History  Patient summary reviewed, nursing notes reviewed and pertinent labs reviewed    Pulmonary  Within defined limits                 Neuro/Psych   Within defined limits           Cardiovascular                  Exercise tolerance: >4 METS     GI/Hepatic/Renal     GERD: well controlled           Endo/Other  Within defined limits           Other Findings              Physical Exam    Airway  Mallampati: II  TM Distance: 4 - 6 cm  Neck ROM: normal range of motion   Mouth opening: Normal     Cardiovascular               Dental  No notable dental hx       Pulmonary                 Abdominal         Other Findings            Anesthetic Plan    ASA: 2  Anesthesia type: spinal      Post-op pain plan if not by surgeon: intrathecal opiates      Anesthetic plan and risks discussed with: Patient      Risks of SAB, including but not limited to bleeding, infection, back pain, headache, nerve injury, and block failure discussed with patient and accepted.

## 2021-09-14 NOTE — ANESTHESIA POSTPROCEDURE EVALUATION
Post-Anesthesia Evaluation and Assessment    Cardiovascular Function/Vital Signs  Visit Vitals  /61   Pulse 77   Temp 36.4 °C (97.5 °F)   Resp 19   Ht 5' 6\" (1.676 m)   Wt 63.5 kg (140 lb)   SpO2 100%   Breastfeeding Unknown   BMI 22.60 kg/m²       Patient is status post Procedure(s):  REPEAT  SECTION. Nausea/Vomiting: Controlled. Postoperative hydration reviewed and adequate. Pain:  Pain Scale 1: Numeric (0 - 10) (21 1200)  Pain Intensity 1: 0 (21 1200)   Managed. Neurological Status:   Neuro (WDL): Within Defined Limits (21 111)   At baseline. Mental Status and Level of Consciousness: Baseline and appropriate for discharge. Pulmonary Status:   O2 Device: None (Room air) (21 1115)   Adequate oxygenation and airway patent. Complications related to anesthesia: None    Post-anesthesia assessment completed. No concerns. Patient has met all discharge requirements.     Signed By: Justin Hernandez MD    2021

## 2021-09-14 NOTE — LACTATION NOTE
This note was copied from a baby's chart. [address] in the room. Will return. 184 per mom, infant latching and nursing well with NS. Mom stated used the 20 mm instead of the 24 mm. Mom stated \"it fits his mouth better and he fel well\". Provided mom with saline rinse products. Encouraged to call for assistance.  mom attempting to get  awake for a feeding. Discussed ways to stimulate the . Discussed normal DOL behaviors.  very spitty at this time. Placed  skin to skin with mom. Encouraged to attempt again within the hour. Mom verbalized understanding.

## 2021-09-14 NOTE — PROGRESS NOTES
TRANSFER - IN REPORT:    Verbal report received from Ministerio Diaz RN (name) on Rosa Isela Kelly  being received from L and D (unit) for routine progression of care      Report consisted of patients Situation, Background, Assessment and   Recommendations(SBAR). Information from the following report(s) SBAR, Intake/Output, MAR and Recent Results was reviewed with the receiving nurse. Opportunity for questions and clarification was provided. Assessment completed upon patients arrival to unit and care assumed. VSS. Oriented to room and unit. Abd dressing intact. Instructed on use of IS and pt demonstrated. Hadley intact. Denies needs. 1630-skin to skin with infant. 1710-toradol given by Sarah Perez RN. Remains skin to skin with infant. 1830-hadley emptied,  Floridalma-care completed, pad changed. Denies needs. 1920-Bedside and Verbal shift change report given to RASHAD Henry RN  (oncoming nurse) by KAMLA Banuelos LPN (offgoing nurse). Report given with SBAR, Kardex, Intake/Output, MAR and Recent Results.

## 2021-09-15 LAB
HCT VFR BLD AUTO: 22.6 % (ref 35–45)
HGB BLD-MCNC: 7.5 G/DL (ref 12–16)

## 2021-09-15 PROCEDURE — 74011250636 HC RX REV CODE- 250/636: Performed by: OBSTETRICS & GYNECOLOGY

## 2021-09-15 PROCEDURE — 85018 HEMOGLOBIN: CPT

## 2021-09-15 PROCEDURE — 74011250637 HC RX REV CODE- 250/637: Performed by: OBSTETRICS & GYNECOLOGY

## 2021-09-15 PROCEDURE — 65270000029 HC RM PRIVATE

## 2021-09-15 PROCEDURE — 36415 COLL VENOUS BLD VENIPUNCTURE: CPT

## 2021-09-15 RX ADMIN — OXYCODONE HYDROCHLORIDE AND ACETAMINOPHEN 1 TABLET: 5; 325 TABLET ORAL at 09:27

## 2021-09-15 RX ADMIN — KETOROLAC TROMETHAMINE 30 MG: 30 INJECTION, SOLUTION INTRAMUSCULAR; INTRAVENOUS at 00:22

## 2021-09-15 RX ADMIN — KETOROLAC TROMETHAMINE 30 MG: 30 INJECTION, SOLUTION INTRAMUSCULAR; INTRAVENOUS at 06:34

## 2021-09-15 RX ADMIN — SODIUM CHLORIDE, SODIUM LACTATE, POTASSIUM CHLORIDE, AND CALCIUM CHLORIDE 1000 ML: 600; 310; 30; 20 INJECTION, SOLUTION INTRAVENOUS at 09:27

## 2021-09-15 RX ADMIN — KETOROLAC TROMETHAMINE 30 MG: 30 INJECTION, SOLUTION INTRAMUSCULAR; INTRAVENOUS at 21:15

## 2021-09-15 RX ADMIN — KETOROLAC TROMETHAMINE 30 MG: 30 INJECTION, SOLUTION INTRAMUSCULAR; INTRAVENOUS at 14:58

## 2021-09-15 NOTE — PROGRESS NOTES
0708 Bedside/Verbal shift change report given to Nataliia Napoles RN (oncoming nurse) by Grisel Tabor RN (offgoing nurse). Report included the following information SBAR, Kardex, Intake/Output, MAR and Recent Results. 7945 Hand expression education completed with mom and handout with spoon given for reinforcement. Showed how to feed infant expressed colostrum with spoon. Mom educated on ways to stimulate baby and encouraged to continue trying to latch baby. Mom verbalized understanding and no questions at this time. 1920 Bedside and Verbal shift change report given to RASHAD Tabor RN (oncoming nurse) by Nataliia Napoles RN (offgoing nurse). Report included the following information SBAR, Kardex, Intake/Output, MAR and Recent Results.

## 2021-09-15 NOTE — PROGRESS NOTES
9/15/2021  3:23 PM    Anesthesia [facility] Post-Op Rounding Note    Referring physician: Henrry Granado MD   Patient status post Procedure(s):  REPEAT  SECTION on 2021      Visit Vitals  /65 (BP 1 Location: Left upper arm, BP Patient Position: At rest;Lying)   Pulse 81   Temp 36.8 °C (98.2 °F)   Resp 16   Ht 5' 6\" (1.676 m)   Wt 63.5 kg (140 lb)   SpO2 100%   Breastfeeding Unknown   BMI 22.60 kg/m²       Patient states pain controlled. Pruritis is  Resolved. Patient has ambulated without problem    No sedation or nausea noted. No complications, adequate analgesia. Continue current postop pain regimen.

## 2021-09-15 NOTE — PROGRESS NOTES
Progress Note    Patient: Easton Encinas MRN: [id number]  SSN: [id number]    YOB: 1995  Age: 27 y.o. Sex: female    ROOM:  242/01      Subjective:     Postpartum Day: 1            Delivery:  delivery    The patient feels well. The patient denies emotional concerns. The baby is well. Baby is feeding via breast.  The patient is ambulating well. The patient  tolerating a normal diet. Flatus denies been passed.     Objective:      Patient Vitals for the past 24 hrs:   BP Temp Pulse Resp SpO2 Height Weight   09/15/21 0005 109/71 98 °F (36.7 °C) 99 16 99 %     21 2220    16 99 %     21 2020 123/74 98.7 °F (37.1 °C) 93 17 98 %     21 1830   92 18 99 %     21 1630   93 16 99 %     21 1435 115/67 98 °F (36.7 °C) 96 18 99 %     21 1415     99 %     21 1410     99 %     21 1406     97 %     21 1401     95 %     21 1356     95 %     21 1351     95 %     21 1346     95 %     21 1341     96 %     21 1336     99 %     21 1331     100 %     21 1326     100 %     21 1323 (!) 122/49  85       21 1321     100 %     21 1300 118/67 97.6 °F (36.4 °C) 83 17 100 %     21 1245 134/88  (!) 107       21 1230 120/85  83       21 1215 112/81  98 18 100 %     21 1200 (!) 119/57  72       21 1146 125/61  77       21 1145   70 19 100 %     21 1132 (!) 107/43  68       21 1129     100 %     21 1125 (!) 161/82  (!) 224       21 1124     100 %     21 1120 (!) 153/53  (!) 201       21 1119     100 %     21 1115 (!) 108/46  84       21 1114     100 %     21 1113 (!) 111/55 97.5 °F (36.4 °C) 74 18 100 %     21 1030   62       21 0810     100 %     21 0807      5' 6\" (1.676 m) 63.5 kg (140 lb)   21 0805     100 %     21 0755 137/79 97.6 °F (36.4 °C) (!) 102 18 100 %       Lochia:  appropriate   Uterine Fundus:   firm   Fundus Location:  -3   Incision:  no significant drainage, no dehiscence, no significant erythema   DVT Evaluation:  No evidence of DVT seen on physical exam.  Negative Becca's sign. No cords or calf tenderness. No significant calf/ankle edema. Lab/Data Review: All lab results for the last 24 hours reviewed. LABS: Recent Results (from the past 48 hour(s))   TYPE & SCREEN    Collection Time: 21 10:29 AM   Result Value Ref Range    Crossmatch Expiration 2021,2359     ABO/Rh(D) Celestino Natty POSITIVE     Antibody screen NEG    CBC WITH AUTOMATED DIFF    Collection Time: 21 10:29 AM   Result Value Ref Range    WBC 9.1 4.6 - 13.2 K/uL    RBC 3.36 (L) 4.20 - 5.30 M/uL    HGB 9.8 (L) 12.0 - 16.0 g/dL    HCT 30.4 (L) 35.0 - 45.0 %    MCV 90.5 78.0 - 100.0 FL    MCH 29.2 24.0 - 34.0 PG    MCHC 32.2 31.0 - 37.0 g/dL    RDW 13.8 11.6 - 14.5 %    PLATELET 621 813 - 312 K/uL    MPV 11.3 9.2 - 11.8 FL    NEUTROPHILS 73 40 - 73 %    LYMPHOCYTES 20 (L) 21 - 52 %    MONOCYTES 7 3 - 10 %    EOSINOPHILS 0 0 - 5 %    BASOPHILS 0 0 - 2 %    ABS. NEUTROPHILS 6.6 1.8 - 8.0 K/UL    ABS. LYMPHOCYTES 1.8 0.9 - 3.6 K/UL    ABS. MONOCYTES 0.6 0.05 - 1.2 K/UL    ABS. EOSINOPHILS 0.0 0.0 - 0.4 K/UL    ABS. BASOPHILS 0.0 0.0 - 0.1 K/UL    DF AUTOMATED     SARS-COV-2 BY KAELA    Collection Time: 21 11:00 AM   Result Value Ref Range    SARS-CoV-2, KAELA Not detected NOTD     HGB & HCT    Collection Time: 09/15/21  3:10 AM   Result Value Ref Range    HGB 7.5 (L) 12.0 - 16.0 g/dL    HCT 22.6 (L) 35.0 - 45.0 %        Assessment:     Status post: Doing well postpartum  delivery     Plan:     Postpartum care discussed including diet, ambulation, and actvitiy restrictions.   Discussed circumcision: Benefits are that it may be easier to keep clean and various ethnic and Yazdanism customs. Risks are bleeding most commonly, which is resolved with pressure or hemostatic gels or sponges. Scarring and infection are possible but extremely unlikely. It may not be be possible to remove all that we would like to remove if the shaft is short, as this would be more likely to lead to scarring. Wants circ done. Discharge instructions and questions answered.        Signed By: Bebeto Sabillon MD     September 15, 2021

## 2021-09-15 NOTE — LACTATION NOTE
This note was copied from a baby's chart. 1710 per mom, has been using the 24 mm NS today and that the  has been doing much better with latching on the larger shield. Mom stated she has also been hand expressing if  is not latching. Mom stated she has been expressing onto the spoon, as well as into the newborns mouth. Provided mom with gel pads for soreness. Will remain available.

## 2021-09-15 NOTE — PROGRESS NOTES
1915: Bedside and Verbal shift change report given to RASHAD Escoto RN (oncoming nurse) by Yashira Odonnell RN (offgoing nurse). Report included the following information SBAR, Kardex, Procedure Summary, Intake/Output, MAR and Recent Results. 2030: Shift assessment complete. Patient denies headache, visual disturbances, and right epigastic pain at this time. Breath sounds clear bilaterally. Patient is passing gas, bowel sounds active, with last BM being 9/13. Fundus firm at U with scant lochia, no clots noted on assessment. IV site inflamed. IV will be removed and a new one will be placed. Patient was given the option of a new IV to continue taking toradol or to switch to motrin. Patient preferred to have a new IV placed in order to help control her pain better. No edema in upper extremities, no in lower extremities. Incision site still has ABD pad. Patient advised to shower and to remove pad. Rangel has been removed with patient succeeding in voiding twice. Patient free of clonus in lower extremities and denying any pain/tenderness behind knees or in calves. Patient rating pain 7/10 and scheduled pain medication held due to phlebitis at this time. Patient educated to notify nursing staff of: SOB, chest pain/heaviness, blurred vision, lightheadedness, increase in bleeding, and passing of clots, patient verbalized understanding. No questions or concerns at this time. 2115: New IV placed in left hand. Previous IV removed due to phlebitis. Toradol given. 2200: Patient called in regard to incision bleeding after shower. When assessed, incision was approximated with dry dark red blood on right side of the incision. Menstrual pad given to patient to put over incision. No questions or concerns. 0000: Infant returned to patient. Patient awake laying in bed resting.    0346: Patient called for pain medication after waking up.      7575: Patient awake in bed.    0710: Bedside and Verbal shift change report given to Stephanie Titus RN (oncoming nurse) by Amara Johnson. Dominique Patel RN (offgoing nurse). Report included the following information SBAR, Kardex, Procedure Summary, Intake/Output, MAR and Recent Results.

## 2021-09-15 NOTE — PROGRESS NOTES
Problem: Pain  Goal: *Control of Pain  Outcome: Progressing Towards Goal     Problem: Pressure Injury - Risk of  Goal: *Prevention of pressure injury  Description: Document Chinedu Scale and appropriate interventions in the flowsheet.   Outcome: Progressing Towards Goal  Note: Pressure Injury Interventions:       Moisture Interventions: Absorbent underpads    Activity Interventions: Pressure redistribution bed/mattress(bed type)    Mobility Interventions: Pressure redistribution bed/mattress (bed type)                          Problem:  Delivery: Postpartum Day 1  Goal: Activity/Safety  Outcome: Progressing Towards Goal  Goal: Consults, if ordered  Outcome: Progressing Towards Goal  Goal: Diagnostic Test/Procedures  Outcome: Progressing Towards Goal  Goal: Nutrition/Diet  Outcome: Progressing Towards Goal  Goal: Discharge Planning  Outcome: Progressing Towards Goal  Goal: Medications  Outcome: Progressing Towards Goal  Goal: Respiratory  Outcome: Progressing Towards Goal  Goal: Treatments/Interventions/Procedures  Outcome: Progressing Towards Goal  Goal: Psychosocial  Outcome: Progressing Towards Goal  Goal: *Vital signs within defined limits  Outcome: Progressing Towards Goal  Goal: *Labs within defined limits  Outcome: Progressing Towards Goal  Goal: *Hemodynamically stable  Outcome: Progressing Towards Goal  Goal: *Optimal pain control at patient's stated goal  Outcome: Progressing Towards Goal  Goal: *Participates in infant care  Outcome: Progressing Towards Goal  Goal: *Demonstrates progressive activity  Outcome: Progressing Towards Goal  Goal: *Tolerating diet  Outcome: Progressing Towards Goal  Goal: *Performs self perineal care  Outcome: Progressing Towards Goal

## 2021-09-15 NOTE — PROGRESS NOTES
1920: Bedside and Verbal shift change report given to RASHAD Reid RN (oncoming nurse) by KAMLA Banuelos LPN (offgoing nurse). Report included the following information SBAR, Kardex, Procedure Summary, Intake/Output, MAR and Recent Results. 1945: Shift assessment complete. Patient denies headache, visual disturbances, and right epigastic pain at this time. Breath sounds clear bilaterally. Patient is not passing gas, bowel sounds active, with last BM being 9/13. Fundus firm at U with scant lochia, no clots noted on assessment. Skin tag noted on right labial side. IV site clean, dry, intact. No edema in upper extremities, no in lower extremities. Incision is clean, dry, and intact. Hadley has been removed with patient succeeding in voiding twice. Patient free of clonus in lower extremities and denying any pain/tenderness behind knees or in calves. Patient rating pain 3/10 and denied pain medication at this time. Patient educated to notify nursing staff of: SOB, chest pain/heaviness, blurred vision, lightheadedness, increase in bleeding, and passing of clots, patient verbalized understanding. No questions or concerns at this time. 2220: Patient awake in bed doing skin to skin with infant. Hadley taken out. No questions or concerns at this time. 2235: Infant returned to patient. Patient trying to breastfeed with nipple shield. 0020: Patient voided first time after hadley removal. Patient administered pain medication. 0200: Patient voided second time after assessment. 0250: Patient returned to room after infant's bath. 5775: Patient awake in bed watching TV. No questions or concerns. 0710: Bedside and Verbal shift change report given to Honorio Lee RN (oncoming nurse) by Gita Reid RN (offgoing nurse). Report included the following information SBAR, Kardex, Procedure Summary, Intake/Output, MAR and Recent Results.

## 2021-09-16 ENCOUNTER — APPOINTMENT (OUTPATIENT)
Dept: PHYSICAL THERAPY | Age: 26
End: 2021-09-16

## 2021-09-16 VITALS
BODY MASS INDEX: 22.5 KG/M2 | TEMPERATURE: 98 F | DIASTOLIC BLOOD PRESSURE: 65 MMHG | HEIGHT: 66 IN | RESPIRATION RATE: 16 BRPM | SYSTOLIC BLOOD PRESSURE: 110 MMHG | WEIGHT: 140 LBS | OXYGEN SATURATION: 100 % | HEART RATE: 76 BPM

## 2021-09-16 LAB
BASOPHILS # BLD: 0 K/UL (ref 0–0.1)
BASOPHILS NFR BLD: 0 % (ref 0–2)
DIFFERENTIAL METHOD BLD: ABNORMAL
EOSINOPHIL # BLD: 0.1 K/UL (ref 0–0.4)
EOSINOPHIL NFR BLD: 1 % (ref 0–5)
ERYTHROCYTE [DISTWIDTH] IN BLOOD BY AUTOMATED COUNT: 13.8 % (ref 11.6–14.5)
ERYTHROCYTE [DISTWIDTH] IN BLOOD BY AUTOMATED COUNT: 14.1 % (ref 11.6–14.5)
HCT VFR BLD AUTO: 21.3 % (ref 35–45)
HCT VFR BLD AUTO: 21.8 % (ref 35–45)
HGB BLD-MCNC: 6.9 G/DL (ref 12–16)
HGB BLD-MCNC: 7.1 G/DL (ref 12–16)
LYMPHOCYTES # BLD: 1.4 K/UL (ref 0.9–3.6)
LYMPHOCYTES NFR BLD: 20 % (ref 21–52)
MCH RBC QN AUTO: 29.1 PG (ref 24–34)
MCH RBC QN AUTO: 29.1 PG (ref 24–34)
MCHC RBC AUTO-ENTMCNC: 32.4 G/DL (ref 31–37)
MCHC RBC AUTO-ENTMCNC: 32.6 G/DL (ref 31–37)
MCV RBC AUTO: 89.3 FL (ref 78–100)
MCV RBC AUTO: 89.9 FL (ref 78–100)
MONOCYTES # BLD: 0.8 K/UL (ref 0.05–1.2)
MONOCYTES NFR BLD: 11 % (ref 3–10)
NEUTS SEG # BLD: 4.7 K/UL (ref 1.8–8)
NEUTS SEG NFR BLD: 68 % (ref 40–73)
PLATELET # BLD AUTO: 123 K/UL (ref 135–420)
PLATELET # BLD AUTO: 141 K/UL (ref 135–420)
PMV BLD AUTO: 11.5 FL (ref 9.2–11.8)
PMV BLD AUTO: 11.6 FL (ref 9.2–11.8)
RBC # BLD AUTO: 2.37 M/UL (ref 4.2–5.3)
RBC # BLD AUTO: 2.44 M/UL (ref 4.2–5.3)
WBC # BLD AUTO: 7 K/UL (ref 4.6–13.2)
WBC # BLD AUTO: 7.1 K/UL (ref 4.6–13.2)

## 2021-09-16 PROCEDURE — 85027 COMPLETE CBC AUTOMATED: CPT

## 2021-09-16 PROCEDURE — 74011250637 HC RX REV CODE- 250/637: Performed by: OBSTETRICS & GYNECOLOGY

## 2021-09-16 PROCEDURE — 36415 COLL VENOUS BLD VENIPUNCTURE: CPT

## 2021-09-16 PROCEDURE — 85025 COMPLETE CBC W/AUTO DIFF WBC: CPT

## 2021-09-16 PROCEDURE — 74011250636 HC RX REV CODE- 250/636: Performed by: OBSTETRICS & GYNECOLOGY

## 2021-09-16 RX ORDER — OXYCODONE AND ACETAMINOPHEN 5; 325 MG/1; MG/1
1-2 TABLET ORAL
Qty: 20 TABLET | Refills: 0 | Status: SHIPPED | OUTPATIENT
Start: 2021-09-16 | End: 2021-09-30

## 2021-09-16 RX ADMIN — KETOROLAC TROMETHAMINE 30 MG: 30 INJECTION, SOLUTION INTRAMUSCULAR; INTRAVENOUS at 09:23

## 2021-09-16 RX ADMIN — KETOROLAC TROMETHAMINE 30 MG: 30 INJECTION, SOLUTION INTRAMUSCULAR; INTRAVENOUS at 15:32

## 2021-09-16 RX ADMIN — IRON SUCROSE 300 MG: 20 INJECTION, SOLUTION INTRAVENOUS at 10:49

## 2021-09-16 RX ADMIN — KETOROLAC TROMETHAMINE 30 MG: 30 INJECTION, SOLUTION INTRAMUSCULAR; INTRAVENOUS at 03:46

## 2021-09-16 RX ADMIN — OXYCODONE HYDROCHLORIDE AND ACETAMINOPHEN 1 TABLET: 5; 325 TABLET ORAL at 10:54

## 2021-09-16 NOTE — DISCHARGE INSTRUCTIONS
Patient Education        Learning About Starting to Breastfeed  Planning ahead     Before your baby is born, plan ahead. Learn all you can about breastfeeding. This helps make breastfeeding easier. · Early in your pregnancy, talk to your doctor or midwife about breastfeeding. · Learn the basics before your baby is born. The staff at hospitals and birthing centers can help you find a lactation specialist. This person is often a nurse who has been trained to teach and advise about breastfeeding. Or you can take a breastfeeding class. · Plan ahead for times when you will need help after your baby is born. You may want to get help from friends and family. You can also join a support group to talk to others who breastfeed. · Buy the equipment you'll need. Examples are breast pads, nipple cream, extra pillows, and nursing bras. Find out about breast pumps too. Getting help from your hospital or birthing center  It's important to have support from the doctors, nurses, and hospital staff who care for you and your baby. Before it's time for you to give birth, ask about the breastfeeding policies at your hospital or birthing center. Look for a hospital or birthing center that has policies for:  · \"Rooming in. \" This policy encourages you to have your baby in the room with you. It can allow you to breastfeed more often. · Supplemental feedings. Tell the staff that your baby is to get only your breast milk from birth. If staff feed your baby water, sugar solution, or formula right after birth without a medical reason, it may make it harder for you to breastfeed. · Pacifiers or artificial nipples. Staff should not give your  these items. They may interfere with breastfeeding. · Follow-up. Find out if your hospital can help you with breastfeeding issues after you go home. See if you can get information on support groups or other contacts.  They might help if you need help setting up and staying with your breastfeeding routine. Your first feeding  It's best to start breastfeeding within 1 hour of birth. For each feeding, you go through these basic steps:  · Get ready for the feeding. Be calm and relaxed, and try not to be distracted. Get some water or juice for yourself. Use two or three pillows to help support your baby while nursing. · Find a breastfeeding position that is comfortable for you and your baby. Examples are the cradle and the football positions. Make sure the baby's head and chest are lined up straight and facing your breast. It's best to switch which breast you start with each time. · Get the baby latched on well. Your baby's mouth needs to be wide open, like a yawn. So you may need to gently touch the middle of your baby's lower lip. When your baby's mouth is open wide, quickly bring the baby onto your nipple and areola. The areola is the dark [place] around your nipple. · Provide a complete feeding. Let your baby decide how long to nurse. Be sure to burp your baby after each breast.  In the first days after birth, your breasts make a thick, yellow liquid called colostrum. This liquid gives your baby nutrients and antibodies against infection. It is all that babies need at first. Your breasts will fill with milk a few days after the birth. Talk to your doctor, midwife, or lactation specialist right away if you are having problems and aren't sure what to do. How often to breastfeed  Plan to breastfeed your baby on demand rather than setting a strict schedule. For the first 2 weeks, be prepared to breastfeed at least 8 times in a 24-hour period. In the first few days, you may need to wake a sleeping baby to feed. If you breastfeed more often, it will help your breasts to produce more milk. After you go home  After you're home, don't be afraid to call your doctor, midwife, or lactation specialist with questions. That's true even if you don't know what's bothering you.  They are used to parents of newborns calling. They can help you figure out if there is a problem, and if so, how to fix it. Plan for times when you will be apart from your baby. Use a breast pump to collect breast milk ahead of time. You can store milk in the refrigerator or freezer. Then it's ready when someone else will be taking care of your baby. Experts recommend waiting about a month until breastfeeding is going well before offering a bottle. Breastfeeding is a learned skill that gets easier over time. You are more likely to succeed if you plan ahead, learn the basic techniques, and know where to get help and support. Where can you learn more? Go to http://www.gray.com/  Enter Q917 in the search box to learn more about \"Learning About Starting to Breastfeed. \"  Current as of: June 16, 2021               Content Version: 13.0  © 8418-1185 [employer]. Care instructions adapted under license by [employer] (which disclaims liability or warranty for this information). If you have questions about a medical condition or this instruction, always ask your healthcare professional. [address] any warranty or liability for your use of this information.

## 2021-09-16 NOTE — LACTATION NOTE
Per mom, infant latched and nursing well. Breastfeeding discharge teaching completed to include feeding on demand, foremilk and hindmilk importance, engorgement, mastitis, clogged ducts, pumping, breastmilk storage, and returning to work. Information given about unit and office phone numbers and encouraged mom to reach out if concerns arise, but that [facility] would be calling her in the next few days to follow up on breastfeeding. Mom verbalized understanding and no questions at this time.

## 2021-09-16 NOTE — PROGRESS NOTES
Post-Operative  Day 2    Dani Montoya     Assessment: Post-Op day 2, doing well    Plan:   1. Routine post-operative care    Information for the patient's :  Sarah Irizarry [[id number]]   , Low Transverse    Patient doing well without significant complaint. Nausea and vomiting resolved, tolerating liquids, passing flatus, voiding and ambulating without difficulty. no dizziness note hbg now active bleeding now. declines blood transfusion but will give fe transfusion and recheck hbg at noon. Is stable pt does wants to go home later today and will f/u in office next week for hbg check and visit    Vitals:  Visit Vitals  /65 (BP 1 Location: Left upper arm, BP Patient Position: At rest;Lying)   Pulse 76   Temp 98 °F (36.7 °C)   Resp 16   Ht 5' 6\" (1.676 m)   Wt 63.5 kg (140 lb)   SpO2 100%   Breastfeeding Unknown   BMI 22.60 kg/m²     Temp (24hrs), Av °F (36.7 °C), Min:97.5 °F (36.4 °C), Max:98.2 °F (36.8 °C)        Exam:        Patient without distress. Abdomen, bowel sounds present, soft, expected tenderness, fundus firm                Wound incision clean, dry and intact               Lower extremities are negative for swelling, cords or tenderness.     Labs:   Lab Results   Component Value Date/Time    WBC 7.0 2021 05:00 AM    WBC 9.1 2021 10:29 AM    HGB 6.9 (L) 2021 05:00 AM    HGB 7.5 (L) 09/15/2021 03:10 AM    HGB 9.8 (L) 2021 10:29 AM    HCT 21.3 (L) 2021 05:00 AM    HCT 22.6 (L) 09/15/2021 03:10 AM    HCT 30.4 (L) 2021 10:29 AM    PLATELET 617 (L)  05:00 AM    PLATELET 014  10:29 AM       Recent Results (from the past 24 hour(s))   CBC WITH AUTOMATED DIFF    Collection Time: 21  5:00 AM   Result Value Ref Range    WBC 7.0 4.6 - 13.2 K/uL    RBC 2.37 (L) 4.20 - 5.30 M/uL    HGB 6.9 (L) 12.0 - 16.0 g/dL    HCT 21.3 (L) 35.0 - 45.0 %    MCV 89.9 78.0 - 100.0 FL    MCH 29.1 24.0 - 34.0 PG    MCHC 32.4 31.0 - 37.0 g/dL    RDW 14.1 11.6 - 14.5 %    PLATELET 892 (L) 168 - 420 K/uL    MPV 11.6 9.2 - 11.8 FL    NEUTROPHILS 68 40 - 73 %    LYMPHOCYTES 20 (L) 21 - 52 %    MONOCYTES 11 (H) 3 - 10 %    EOSINOPHILS 1 0 - 5 %    BASOPHILS 0 0 - 2 %    ABS. NEUTROPHILS 4.7 1.8 - 8.0 K/UL    ABS. LYMPHOCYTES 1.4 0.9 - 3.6 K/UL    ABS. MONOCYTES 0.8 0.05 - 1.2 K/UL    ABS. EOSINOPHILS 0.1 0.0 - 0.4 K/UL    ABS.  BASOPHILS 0.0 0.0 - 0.1 K/UL    DF AUTOMATED       Qi Alvarado MD  9/16/2021  9:05 AM

## 2021-09-16 NOTE — PROGRESS NOTES
0710: Bedside and verbal shift change report given to ESHA Serrano RN by Beti Jaimes. Alm Schwab, RN. Assumed care of pt at this time. 1313:  Assessment completed at this time. 1545: D/C teaching complete and copy of D/C teaching instruction given to pt with verbal understanding. Pt given opportunity for questions and denies comments/concerns/questions at this time. Bands verified. 1652: HUGS tag removed from infant. FOB,MOB OBOB, and infant escorted to vehicle at this time.

## 2021-09-16 NOTE — PROGRESS NOTES
Problem: General Medical Care Plan  Goal: *Vital signs within specified parameters  Outcome: Progressing Towards Goal  Goal: *Labs within defined limits  Outcome: Progressing Towards Goal  Goal: *Absence of infection signs and symptoms  Outcome: Progressing Towards Goal  Goal: *Optimal pain control at patient's stated goal  Outcome: Progressing Towards Goal  Goal: *Skin integrity maintained  Outcome: Progressing Towards Goal  Goal: *Fluid volume balance  Outcome: Progressing Towards Goal  Goal: *Optimize nutritional status  Outcome: Progressing Towards Goal  Goal: *Anxiety reduced or absent  Outcome: Progressing Towards Goal  Goal: *Progressive mobility and function (eg: ADL's)  Outcome: Progressing Towards Goal     Problem: Patient Education: Go to Patient Education Activity  Goal: Patient/Family Education  Outcome: Progressing Towards Goal     Problem: Pain  Goal: *Control of Pain  Outcome: Progressing Towards Goal     Problem: Pressure Injury - Risk of  Goal: *Prevention of pressure injury  Description: Document Chinedu Scale and appropriate interventions in the flowsheet. Outcome: Progressing Towards Goal  Note: Pressure Injury Interventions:       Moisture Interventions: Absorbent underpads    Activity Interventions: Pressure redistribution bed/mattress(bed type)    Mobility Interventions: Pressure redistribution bed/mattress (bed type)                          Problem: Patient Education: Go to Patient Education Activity  Goal: Patient/Family Education  Outcome: Progressing Towards Goal     Problem: Falls - Risk of  Goal: *Absence of Falls  Description: Document Morgan Fall Risk and appropriate interventions in the flowsheet.   Outcome: Progressing Towards Goal  Note: Fall Risk Interventions:            Medication Interventions: Teach patient to arise slowly    Elimination Interventions: Call light in reach              Problem: Patient Education: Go to Patient Education Activity  Goal: Patient/Family Education  Outcome: Progressing Towards Goal

## 2021-09-17 NOTE — PROGRESS NOTES
Problem: General Medical Care Plan  Goal: *Vital signs within specified parameters  9/16/2021 2006 by Cassidy Dailey RN  Outcome: Resolved/Met  9/16/2021 1009 by Cassidy Dailey RN  Outcome: Progressing Towards Goal  Goal: *Labs within defined limits  9/16/2021 2006 by Cassidy Dailey RN  Outcome: Resolved/Met  9/16/2021 1009 by Vijaya Bartlett ([place]), RN  Outcome: Progressing Towards Goal  Goal: *Absence of infection signs and symptoms  9/16/2021 2006 by Cassidy Dailey, RN  Outcome: Resolved/Met  9/16/2021 1009 by Cassidy Dailey RN  Outcome: Progressing Towards Goal  Goal: *Optimal pain control at patient's stated goal  9/16/2021 2006 by Casisdy Dailey RN  Outcome: Resolved/Met  9/16/2021 1009 by Vijaya Bartlett ([place]), RN  Outcome: Progressing Towards Goal  Goal: *Skin integrity maintained  9/16/2021 2006 by Cassidy Dailey RN  Outcome: Resolved/Met  9/16/2021 1009 by Vijaya Bartlett ([place]), RN  Outcome: Progressing Towards Goal  Goal: *Fluid volume balance  9/16/2021 2006 by Alka Lilly ([place]), RN  Outcome: Resolved/Met  9/16/2021 1009 by Vijaya Bartlett ([place]), RN  Outcome: Progressing Towards Goal  Goal: *Optimize nutritional status  9/16/2021 2006 by Cassidy Dailey RN  Outcome: Resolved/Met  9/16/2021 1009 by Vijaya Bartlett ([place]), RN  Outcome: Progressing Towards Goal  Goal: *Anxiety reduced or absent  9/16/2021 2006 by Cassidy Dailey RN  Outcome: Resolved/Met  9/16/2021 1009 by Vijaya Bartlett ([place]), RN  Outcome: Progressing Towards Goal  Goal: *Progressive mobility and function (eg: ADL's)  9/16/2021 2006 by Cassidy Dailey RN  Outcome: Resolved/Met  9/16/2021 1009 by Alka Lilly ([place]), RN  Outcome: Progressing Towards Goal     Problem: Patient Education: Go to Patient Education Activity  Goal: Patient/Family Education  9/16/2021 2006 by Cassidy Dailey RN  Outcome: Resolved/Met  9/16/2021 1009 by Alka Lilly ([place]), RN  Outcome: Progressing Towards Goal     Problem: Pain  Goal: *Control of Pain  9/16/2021 2006 by Vijaya Bartlett ([place]), RN  Outcome: Resolved/Met  2021 1009 by Chin Ritter RN  Outcome: Progressing Towards Goal     Problem: Pressure Injury - Risk of  Goal: *Prevention of pressure injury  Description: Document Chinedu Scale and appropriate interventions in the flowsheet.   2021 2006 by Chin Ritter RN  Outcome: Resolved/Met  Note: Pressure Injury Interventions:       Moisture Interventions: Absorbent underpads    Activity Interventions: Pressure redistribution bed/mattress(bed type)    Mobility Interventions: Pressure redistribution bed/mattress (bed type)                       2021 1009 by Vijaya Serrano ([place]), RN  Outcome: Progressing Towards Goal  Note: Pressure Injury Interventions:       Moisture Interventions: Absorbent underpads    Activity Interventions: Pressure redistribution bed/mattress(bed type)    Mobility Interventions: Pressure redistribution bed/mattress (bed type)                          Problem: Patient Education: Go to Patient Education Activity  Goal: Patient/Family Education  2021 2006 by Chin Ritter RN  Outcome: Resolved/Met  2021 1009 by Vijaya Croft ([place]), RN  Outcome: Progressing Towards Goal     Problem:  Delivery: Postpartum Day 1  Goal: Activity/Safety  2021 2006 by Chin Ritter RN  Outcome: Resolved/Met  2021 1009 by Jamin Lilly ([place]), RN  Outcome: Progressing Towards Goal  Goal: Consults, if ordered  2021 2006 by Chin Ritter RN  Outcome: Resolved/Met  2021 1009 by Jamin Lilly ([place]), RN  Outcome: Progressing Towards Goal  Goal: Diagnostic Test/Procedures  2021 2006 by Chin Ritter RN  Outcome: Resolved/Met  2021 1009 by Jamin Lilly ([place]), RN  Outcome: Progressing Towards Goal  Goal: Nutrition/Diet  2021 by Chin Ritter RN  Outcome: Resolved/Met  2021 1009 by Jamin Lilly ([place]), [address] [place] [address]  Outcome: Progressing Towards Goal  Goal: Discharge Planning  2021 by Chin Ritter RN  Outcome: Resolved/Met  2021 1009 by Leigh Crockett, RN  Outcome: Progressing Towards Goal  Goal: Medications  9/16/2021 2006 by Leigh Crockett, RN  Outcome: Resolved/Met  9/16/2021 1009 by Neyda Lilly ([place]), RN  Outcome: Progressing Towards Goal  Goal: Respiratory  9/16/2021 2006 by Leigh Crockett, RN  Outcome: Resolved/Met  9/16/2021 1009 by Vijaya Jansen ([place]), RN  Outcome: Progressing Towards Goal  Goal: Treatments/Interventions/Procedures  9/16/2021 2006 by Leigh Crockett, RN  Outcome: Resolved/Met  9/16/2021 1009 by Vijaya Jansen ([place]), RN  Outcome: Progressing Towards Goal  Goal: Psychosocial  9/16/2021 2006 by Leigh Crockett, RN  Outcome: Resolved/Met  9/16/2021 1009 by Leigh Crockett, RN  Outcome: Progressing Towards Goal  Goal: *Vital signs within defined limits  9/16/2021 2006 by Leigh Crockett, RN  Outcome: Resolved/Met  9/16/2021 1009 by Vijaya Jansen ([place]), RN  Outcome: Progressing Towards Goal  Goal: *Labs within defined limits  9/16/2021 2006 by Leigh Crockett, RN  Outcome: Resolved/Met  9/16/2021 1009 by Vijaya Jansen ([place]), RN  Outcome: Progressing Towards Goal  Goal: *Hemodynamically stable  9/16/2021 2006 by Leigh Crockett, RN  Outcome: Resolved/Met  9/16/2021 1009 by Vijaya Jansen ([place]), RN  Outcome: Progressing Towards Goal  Goal: *Optimal pain control at patient's stated goal  9/16/2021 2006 by Leigh Crockett, RN  Outcome: Resolved/Met  9/16/2021 1009 by Vijaya Jansen ([place]), RN  Outcome: Progressing Towards Goal  Goal: *Participates in infant care  9/16/2021 2006 by Leigh Crockett, RN  Outcome: Resolved/Met  9/16/2021 1009 by Neyda Lilly ([place]), RN  Outcome: Progressing Towards Goal  Goal: *Demonstrates progressive activity  9/16/2021 2006 by Chancy Knock, RN  Outcome: Resolved/Met  9/16/2021 1009 by Neyda Lilly ([place]), RN  Outcome: Progressing Towards Goal  Goal: *Tolerating diet  9/16/2021 2006 by Neyda Lilly ([place]), RN  Outcome: Resolved/Met  9/16/2021 1009 by Leigh Crockett RN  Outcome: Progressing Towards Goal  Goal: *Performs self perineal care  9/16/2021 2006 by Zaid Mishra RN  Outcome: Resolved/Met  9/16/2021 1009 by Zaid Mishra RN  Outcome: Progressing Towards Goal     Problem: Falls - Risk of  Goal: *Absence of Falls  Description: Document Katia Leon Fall Risk and appropriate interventions in the flowsheet.   9/16/2021 2006 by Zaid Mishra RN  Outcome: Resolved/Met  9/16/2021 1009 by Zaid Mishra RN  Outcome: Progressing Towards Goal  Note: Fall Risk Interventions:            Medication Interventions: Teach patient to arise slowly    Elimination Interventions: Call light in reach              Problem: Patient Education: Go to Patient Education Activity  Goal: Patient/Family Education  9/16/2021 2006 by Zaid Mishra RN  Outcome: Resolved/Met  9/16/2021 1009 by Zaid Mishra RN  Outcome: Progressing Towards Goal

## 2021-09-21 NOTE — DISCHARGE SUMMARY
Gynecology Surgical Discharge Summary     Name: Blaze Mcguire MRN: [id number]  SSN: [id number]    YOB: 1995  Age: 27 y.o. Sex: female      Admit date: 2021    Discharge Date: 2021      Attending Physician: Reva Alex MD     Admission Diagnoses: previous  section for repeat at term    Discharge Diagnoses: Principal Problem:    Previous  section (2021)    Active Problems:    39 weeks gestation of pregnancy (2021)      IUP (intrauterine pregnancy), incidental (2021)         Procedures: repeat lst c/s    Hospital Course: Normal hospital course for this procedure. Significant Diagnostic Studies: No results found for this or any previous visit (from the past 24 hour(s)). Patient Instructions:   Cannot display discharge medications since this patient is not currently admitted. Activity: No sex, douching, or tampons for 6 weeks or as directed by your physician. No heavy lifting for 6 weeks. No driving while taking pain medication. Diet: Resume pre-hospital diet  Wound Care: Keep wound clean and dry    Follow-up Appointments   Procedures    FOLLOW UP VISIT Appointment in: One Week     Standing Status:   Standing     Number of Occurrences:   1     Order Specific Question:   Appointment in     Answer: One Week        Signed By:  Reva Alex MD     2021        .
no

## 2021-09-28 ENCOUNTER — APPOINTMENT (OUTPATIENT)
Dept: PHYSICAL THERAPY | Age: 26
End: 2021-09-28

## 2021-11-16 NOTE — PROGRESS NOTES
[facility]  [address] [place] [address], [address]  Tel. [phone]  Fax: [phone]    Physical Therapy Discharge Summary    Patient Name: India Aguila : 1995   Treatment/Medical Diagnosis: Neck pain [M54.2]  Right shoulder pain [M25.511]  Left shoulder pain [M25.512]   Referral Source: Pedro [place] MD     Date of Initial Visit: 21 Attended Visits: 4 Missed Visits: 4       SUMMARY OF TREATMENT  Pt attended only initial evaluation and     3     follow-ups with the last attended visit on 21 and then did not return. Therefore a formal reassessment of goals was not performed. RECOMMENDATIONS  Discontinue physical therapy due to patient not returning. Pt shall remain under care of MD.      If you have any questions/comments please contact us directly at [phone]. Thank you for allowing us to assist in the care of your patient. Therapist Signature: Jennifer Thomas, PT, DPT, CIMT Date: 21     Time: 10:36 AM     NOTE TO PHYSICIAN:  Please complete the following and fax to: [facility] at [phone]  Retain this original for your records. If you are unable to process this request in   24 hours, please contact our office.      [] I have read the above report and request that my patient continue therapy with the following changes/special instructions:  [] I have read the above report and request that my patient be discharged from therapy    Physician's Signature:____________Date:_________TIME:________    ** Signature, Date and Time must be completed for valid certification **

## 2023-01-28 ENCOUNTER — HOSPITAL ENCOUNTER (EMERGENCY)
Age: 28
Discharge: HOME | End: 2023-01-28
Attending: STUDENT IN AN ORGANIZED HEALTH CARE EDUCATION/TRAINING PROGRAM
Payer: MEDICAID

## 2023-01-28 ENCOUNTER — HOSPITAL ENCOUNTER (OUTPATIENT)
Age: 28
Discharge: HOME | End: 2023-01-28
Attending: STUDENT IN AN ORGANIZED HEALTH CARE EDUCATION/TRAINING PROGRAM | Admitting: STUDENT IN AN ORGANIZED HEALTH CARE EDUCATION/TRAINING PROGRAM
Payer: MEDICAID

## 2023-01-28 ENCOUNTER — APPOINTMENT (OUTPATIENT)
Dept: ULTRASOUND IMAGING | Age: 28
End: 2023-01-28
Attending: PHYSICIAN ASSISTANT
Payer: MEDICAID

## 2023-01-28 VITALS
TEMPERATURE: 98.1 F | WEIGHT: 116 LBS | OXYGEN SATURATION: 100 % | SYSTOLIC BLOOD PRESSURE: 117 MMHG | DIASTOLIC BLOOD PRESSURE: 72 MMHG | BODY MASS INDEX: 18.64 KG/M2 | RESPIRATION RATE: 14 BRPM | HEIGHT: 66 IN | HEART RATE: 86 BPM

## 2023-01-28 VITALS
TEMPERATURE: 98.2 F | HEIGHT: 66 IN | HEART RATE: 125 BPM | RESPIRATION RATE: 18 BRPM | OXYGEN SATURATION: 98 % | DIASTOLIC BLOOD PRESSURE: 71 MMHG | BODY MASS INDEX: 18.64 KG/M2 | WEIGHT: 116 LBS | SYSTOLIC BLOOD PRESSURE: 115 MMHG

## 2023-01-28 DIAGNOSIS — A08.4 VIRAL GASTROENTERITIS: Primary | ICD-10-CM

## 2023-01-28 PROBLEM — R11.2 NAUSEA & VOMITING: Status: ACTIVE | Noted: 2023-01-28

## 2023-01-28 LAB
ALBUMIN SERPL-MCNC: 3.2 G/DL (ref 3.4–5)
ALBUMIN/GLOB SERPL: 0.8 (ref 0.8–1.7)
ALP SERPL-CCNC: 75 U/L (ref 45–117)
ALT SERPL-CCNC: 12 U/L (ref 13–56)
ANION GAP SERPL CALC-SCNC: 9 MMOL/L (ref 3–18)
APPEARANCE UR: CLEAR
AST SERPL-CCNC: 13 U/L (ref 10–38)
BACTERIA URNS QL MICRO: ABNORMAL /HPF
BASOPHILS # BLD: 0 K/UL (ref 0–0.1)
BASOPHILS NFR BLD: 0 % (ref 0–2)
BILIRUB SERPL-MCNC: 0.4 MG/DL (ref 0.2–1)
BILIRUB UR QL: NEGATIVE
BUN SERPL-MCNC: 13 MG/DL (ref 7–18)
BUN/CREAT SERPL: 21 (ref 12–20)
CALCIUM SERPL-MCNC: 8.6 MG/DL (ref 8.5–10.1)
CHLORIDE SERPL-SCNC: 107 MMOL/L (ref 100–111)
CO2 SERPL-SCNC: 24 MMOL/L (ref 21–32)
COLOR UR: YELLOW
CREAT SERPL-MCNC: 0.61 MG/DL (ref 0.6–1.3)
DIFFERENTIAL METHOD BLD: ABNORMAL
EOSINOPHIL # BLD: 0 K/UL (ref 0–0.4)
EOSINOPHIL NFR BLD: 0 % (ref 0–5)
EPITH CASTS URNS QL MICRO: ABNORMAL /LPF (ref 0–5)
ERYTHROCYTE [DISTWIDTH] IN BLOOD BY AUTOMATED COUNT: 13.6 % (ref 11.6–14.5)
FLUAV RNA SPEC QL NAA+PROBE: NOT DETECTED
FLUBV RNA SPEC QL NAA+PROBE: NOT DETECTED
GLOBULIN SER CALC-MCNC: 3.8 G/DL (ref 2–4)
GLUCOSE SERPL-MCNC: 99 MG/DL (ref 74–99)
GLUCOSE UR STRIP.AUTO-MCNC: NEGATIVE MG/DL
HCT VFR BLD AUTO: 36 % (ref 35–45)
HGB BLD-MCNC: 12.3 G/DL (ref 12–16)
HGB UR QL STRIP: NEGATIVE
IMM GRANULOCYTES # BLD AUTO: 0 K/UL (ref 0–0.04)
IMM GRANULOCYTES NFR BLD AUTO: 0 % (ref 0–0.5)
KETONES UR QL STRIP.AUTO: 80 MG/DL
LEUKOCYTE ESTERASE UR QL STRIP.AUTO: ABNORMAL
LYMPHOCYTES # BLD: 0.2 K/UL (ref 0.9–3.6)
LYMPHOCYTES NFR BLD: 3 % (ref 21–52)
MCH RBC QN AUTO: 31.6 PG (ref 24–34)
MCHC RBC AUTO-ENTMCNC: 34.2 G/DL (ref 31–37)
MCV RBC AUTO: 92.5 FL (ref 78–100)
MONOCYTES # BLD: 0.3 K/UL (ref 0.05–1.2)
MONOCYTES NFR BLD: 4 % (ref 3–10)
MUCOUS THREADS URNS QL MICRO: ABNORMAL /LPF
NEUTS SEG # BLD: 7.4 K/UL (ref 1.8–8)
NEUTS SEG NFR BLD: 93 % (ref 40–73)
NITRITE UR QL STRIP.AUTO: NEGATIVE
NRBC # BLD: 0 K/UL (ref 0–0.01)
NRBC BLD-RTO: 0 PER 100 WBC
PH UR STRIP: 6 (ref 5–8)
PLATELET # BLD AUTO: 181 K/UL (ref 135–420)
PMV BLD AUTO: 10.5 FL (ref 9.2–11.8)
POTASSIUM SERPL-SCNC: 3.9 MMOL/L (ref 3.5–5.5)
PROT SERPL-MCNC: 7 G/DL (ref 6.4–8.2)
PROT UR STRIP-MCNC: NEGATIVE MG/DL
RBC # BLD AUTO: 3.89 M/UL (ref 4.2–5.3)
RBC #/AREA URNS HPF: ABNORMAL /HPF (ref 0–5)
SARS-COV-2, COV2: NOT DETECTED
SERVICE CMNT-IMP: NORMAL
SODIUM SERPL-SCNC: 140 MMOL/L (ref 136–145)
SP GR UR REFRACTOMETRY: 1.02 (ref 1–1.03)
UROBILINOGEN UR QL STRIP.AUTO: 0.2 EU/DL (ref 0.2–1)
WBC # BLD AUTO: 7.9 K/UL (ref 4.6–13.2)
WBC URNS QL MICRO: ABNORMAL /HPF (ref 0–5)
WET PREP GENITAL: NORMAL

## 2023-01-28 PROCEDURE — 76770 US EXAM ABDO BACK WALL COMP: CPT

## 2023-01-28 PROCEDURE — 76805 OB US >/= 14 WKS SNGL FETUS: CPT

## 2023-01-28 PROCEDURE — 74011250636 HC RX REV CODE- 250/636: Performed by: PHYSICIAN ASSISTANT

## 2023-01-28 PROCEDURE — 96374 THER/PROPH/DIAG INJ IV PUSH: CPT

## 2023-01-28 PROCEDURE — 74011250637 HC RX REV CODE- 250/637: Performed by: PHYSICIAN ASSISTANT

## 2023-01-28 PROCEDURE — 85025 COMPLETE CBC W/AUTO DIFF WBC: CPT

## 2023-01-28 PROCEDURE — 87086 URINE CULTURE/COLONY COUNT: CPT

## 2023-01-28 PROCEDURE — 87210 SMEAR WET MOUNT SALINE/INK: CPT

## 2023-01-28 PROCEDURE — 81001 URINALYSIS AUTO W/SCOPE: CPT

## 2023-01-28 PROCEDURE — 87636 SARSCOV2 & INF A&B AMP PRB: CPT

## 2023-01-28 PROCEDURE — 96361 HYDRATE IV INFUSION ADD-ON: CPT

## 2023-01-28 PROCEDURE — 87491 CHLMYD TRACH DNA AMP PROBE: CPT

## 2023-01-28 PROCEDURE — 99283 EMERGENCY DEPT VISIT LOW MDM: CPT

## 2023-01-28 PROCEDURE — 96375 TX/PRO/DX INJ NEW DRUG ADDON: CPT

## 2023-01-28 PROCEDURE — 80053 COMPREHEN METABOLIC PANEL: CPT

## 2023-01-28 PROCEDURE — 99284 EMERGENCY DEPT VISIT MOD MDM: CPT

## 2023-01-28 RX ORDER — ACETAMINOPHEN 325 MG/1
650 TABLET ORAL
Status: COMPLETED | OUTPATIENT
Start: 2023-01-28 | End: 2023-01-28

## 2023-01-28 RX ORDER — ONDANSETRON 2 MG/ML
4 INJECTION INTRAMUSCULAR; INTRAVENOUS
Status: COMPLETED | OUTPATIENT
Start: 2023-01-28 | End: 2023-01-28

## 2023-01-28 RX ORDER — DIPHENHYDRAMINE HYDROCHLORIDE 50 MG/ML
25 INJECTION, SOLUTION INTRAMUSCULAR; INTRAVENOUS
Status: COMPLETED | OUTPATIENT
Start: 2023-01-28 | End: 2023-01-28

## 2023-01-28 RX ORDER — METOCLOPRAMIDE HYDROCHLORIDE 5 MG/ML
5 INJECTION INTRAMUSCULAR; INTRAVENOUS
Status: COMPLETED | OUTPATIENT
Start: 2023-01-28 | End: 2023-01-28

## 2023-01-28 RX ORDER — ONDANSETRON 4 MG/1
4 TABLET, ORALLY DISINTEGRATING ORAL
Qty: 20 TABLET | Refills: 0 | Status: SHIPPED | OUTPATIENT
Start: 2023-01-28

## 2023-01-28 RX ADMIN — SODIUM CHLORIDE 1000 ML: 9 INJECTION, SOLUTION INTRAVENOUS at 12:52

## 2023-01-28 RX ADMIN — DIPHENHYDRAMINE HYDROCHLORIDE 25 MG: 50 INJECTION, SOLUTION INTRAMUSCULAR; INTRAVENOUS at 12:51

## 2023-01-28 RX ADMIN — ONDANSETRON 4 MG: 2 INJECTION INTRAMUSCULAR; INTRAVENOUS at 11:06

## 2023-01-28 RX ADMIN — METOCLOPRAMIDE 5 MG: 5 INJECTION, SOLUTION INTRAMUSCULAR; INTRAVENOUS at 12:51

## 2023-01-28 RX ADMIN — ACETAMINOPHEN 650 MG: 325 TABLET ORAL at 14:53

## 2023-01-28 RX ADMIN — SODIUM CHLORIDE 1000 ML: 9 INJECTION, SOLUTION INTRAVENOUS at 11:06

## 2023-01-28 NOTE — PROGRESS NOTES
0  @ 20 weeks arrived from home with c/o back pain and vomiting since 230. To bathroom to void, ambulated to bed and connected to [facility]. [address] [facility]. Juan Antonio SARKAR at bedside assessing pt.    1250 3year old son sitting in chair projectile vomits on floor, patient gets up to clean up son. 1022 Discharge instructions given, pt verbalized understanding. 1025 Pt transferred to ER waiting room via wheelchair.

## 2023-01-28 NOTE — ED NOTES
Patient ambulatory to ED with her two children c/o nausea and vomiting and L lower back pain; patient was seen upstairs in OB and sent back down to ED. Patient reports vomiting since 230 this morning with no relief.

## 2023-01-28 NOTE — ED NOTES
Camille called for patient & her two young children upon discharge. Due to patient not having car seats for children, camille was denied. Patient reports she has no way of getting to her shelter at this time & that it is almost an hour walk to the shelter with her young children. Patient attempted to find closes bus stop but it was 30 minute walk down the road. Patient brought back to ED room. Children given clean gowns. Food ordered for patient and children. At this time, arranging ride/obtaining car seats at this time.

## 2023-01-28 NOTE — PROGRESS NOTES
Triage Progress Note      Patient presented to triage at 20 weeks o days via EMS unaccompanied with 2 small children. States she had a dating scan approximately 3 weeks ago at [facility] clinic which was consistent with her LMP of 9/10/22 giving her an SHANIA of 23. She states she was seen yesterday at [facility] for an initial confirmation nurse visit, however is awaiting a NOB/morphology visit. Past OB hx includes 2 prior  sections in  and . Cureent pregnancy has been complicated by late access to care, close conception, and N&v of pregnancy. Current complaints include persistent vomiting since 230, loose stools, dizziness, and some lower back pain that has not changed in intensity: Of note, 3year old son vomited while in triage. Physical Exam:  Cervical Exam:  deferred  Membranes:  intact  Uterine Activity: none  Fetal Heart Rate: 160s  Variability: na  Accelerations: na  Decelerations: na     Assessment/Plan:  Patient cleared obstetrically; D/c to be evaluated further in the ED.  Plan office follow up at [facility] as scheduled     Lauren High CNM

## 2023-01-28 NOTE — DISCHARGE INSTRUCTIONS
Push clear liquids, avoid dairy, bland diet. Suspect a viral stomach bug causing nausea and vomiting. Stay hydrated. Return to the emergency department immediately if you develop fever, worsening or change in your back pain, vaginal bleeding, excessive vomiting and unable to keep liquids down.   Please follow-up with OB/GYN as well as her family medicine specialist.

## 2023-01-28 NOTE — ED PROVIDER NOTES
[facility] EMERGENCY DEPT  EMERGENCY DEPARTMENT ENCOUNTER       Pt Name: Belkis Phillips  MRN: [id number]  [place] 1995  Date of evaluation: 2023  Provider: Joya Henry PA-C   PCP: ANISHA Guzman  Note Started: 11:17 AM 23     CHIEF COMPLAINT       Chief Complaint   Patient presents with    Back Pain    Vomiting     Patient c/o back pain & vomiting since 230am this morning; pt is 20 weeks pregnant           HISTORY OF PRESENT ILLNESS: 1 or more elements      History From: Patient  HPI Limitations : None     Belkis Phillips is a 24 y.o. female who presents with a chief complaint of nausea and vomiting onset early this morning. Patient is 20 weeks pregnant, has not had any prior pre-tess care, only a, information ultrasound by a nurse 3 weeks ago. Last menstrual cycle September 10 with an estimated date of delivery of . Patient has associated left lower back pain. She reports that she has had frequent back pain leading up to the vomiting however it worsened. One of her children at home also had 1 episode of vomiting today. She reports some mild vaginal discharge and pelvic cramping. Patient originally was sent to L&D upon hospital arrival for her pelvic pain, and has had fetal monitoring with no abnormalities. Denies dysuria, hematuria, chest pain, fever, vaginal bleeding, cough, congestion. Nursing Notes were all reviewed and agreed with or any disagreements were addressed in the HPI. REVIEW OF SYSTEMS      Review of Systems   Constitutional:  Negative for activity change, chills and fever. HENT:  Negative for congestion, ear pain, rhinorrhea, sneezing and sore throat. Eyes:  Negative for pain and visual disturbance. Respiratory:  Negative for cough and shortness of breath. Cardiovascular:  Negative for chest pain. Gastrointestinal:  Positive for nausea and vomiting. Negative for abdominal pain and diarrhea.    Genitourinary:  Positive for flank pain, pelvic pain and vaginal discharge. Negative for dysuria, hematuria and vaginal bleeding. Musculoskeletal:  Positive for back pain. Negative for gait problem. Skin:  Negative for rash. Neurological:  Negative for speech difficulty, weakness and headaches. Psychiatric/Behavioral:  The patient is not nervous/anxious. All other systems reviewed and are negative. Positives and Pertinent negatives as per HPI. PAST HISTORY     Past Medical History:  Past Medical History:   Diagnosis Date    Anemia     Breast disorder        Past Surgical History:  Past Surgical History:   Procedure Laterality Date    HX  SECTION         Family History:  No family history on file. Social History:  Social History     Tobacco Use    Smoking status: Never    Smokeless tobacco: Never   Vaping Use    Vaping Use: Never used   Substance Use Topics    Alcohol use: Never    Drug use: Never       Allergies: Allergies   Allergen Reactions    Aloe Rash       CURRENT MEDICATIONS      Previous Medications    PRENATAL VIT/IRON FUM/FOLIC AC (PRENATAL 1+1 PO)    Take 1 Tablet by mouth daily. SCREENINGS               No data recorded         PHYSICAL EXAM      ED Triage Vitals [23 1050]   ED Encounter Vitals Group      /66      Pulse (Heart Rate) 99      Resp       Temp 98.1 °F (36.7 °C)      Temp src       O2 Sat (%) 100 %      Weight 116 lb      Height 5' 6\"        Physical Exam  Vitals and nursing note reviewed. Constitutional:       General: She is not in acute distress. Appearance: Normal appearance. She is well-developed. She is not ill-appearing or diaphoretic. HENT:      Head: Normocephalic and atraumatic. Nose: Nose normal.      Mouth/Throat:      Mouth: Mucous membranes are dry. Pharynx: Oropharynx is clear. Eyes:      Extraocular Movements: Extraocular movements intact. Pupils: Pupils are equal, round, and reactive to light.    Cardiovascular:      Rate and Rhythm: Normal rate and regular rhythm. Pulses: Normal pulses. Heart sounds: Normal heart sounds. Pulmonary:      Effort: Pulmonary effort is normal. No respiratory distress. Breath sounds: Normal breath sounds. No wheezing or rhonchi. Abdominal:      General: Abdomen is flat. Bowel sounds are normal.      Palpations: Abdomen is soft. Tenderness: There is no abdominal tenderness. There is left CVA tenderness. There is no right CVA tenderness, guarding or rebound. Negative signs include McBurney's sign. Comments: Gravid   Musculoskeletal:         General: Normal range of motion. Cervical back: Normal, normal range of motion and neck supple. Thoracic back: Normal.      Lumbar back: Tenderness present. No bony tenderness. Back:       Right lower leg: No edema. Left lower leg: No edema. Lymphadenopathy:      Cervical: No cervical adenopathy. Skin:     General: Skin is warm and dry. Capillary Refill: Capillary refill takes less than 2 seconds. Neurological:      General: No focal deficit present. Mental Status: She is alert and oriented to person, place, and time. DIAGNOSTIC RESULTS   LABS:     Recent Results (from the past 12 hour(s))   CBC WITH AUTOMATED DIFF    Collection Time: 01/28/23 11:01 AM   Result Value Ref Range    WBC 7.9 4.6 - 13.2 K/uL    RBC 3.89 (L) 4.20 - 5.30 M/uL    HGB 12.3 12.0 - 16.0 g/dL    HCT 36.0 35.0 - 45.0 %    MCV 92.5 78.0 - 100.0 FL    MCH 31.6 24.0 - 34.0 PG    MCHC 34.2 31.0 - 37.0 g/dL    RDW 13.6 11.6 - 14.5 %    PLATELET 839 459 - 719 K/uL    MPV 10.5 9.2 - 11.8 FL    NRBC 0.0 0  WBC    ABSOLUTE NRBC 0.00 0.00 - 0.01 K/uL    NEUTROPHILS 93 (H) 40 - 73 %    LYMPHOCYTES 3 (L) 21 - 52 %    MONOCYTES 4 3 - 10 %    EOSINOPHILS 0 0 - 5 %    BASOPHILS 0 0 - 2 %    IMMATURE GRANULOCYTES 0 0.0 - 0.5 %    ABS. NEUTROPHILS 7.4 1.8 - 8.0 K/UL    ABS. LYMPHOCYTES 0.2 (L) 0.9 - 3.6 K/UL    ABS. MONOCYTES 0.3 0.05 - 1.2 K/UL    ABS.  EOSINOPHILS 0.0 0.0 - 0.4 K/UL    ABS. BASOPHILS 0.0 0.0 - 0.1 K/UL    ABS. IMM. GRANS. 0.0 0.00 - 0.04 K/UL    DF AUTOMATED     METABOLIC PANEL, COMPREHENSIVE    Collection Time: 01/28/23 11:01 AM   Result Value Ref Range    Sodium 140 136 - 145 mmol/L    Potassium 3.9 3.5 - 5.5 mmol/L    Chloride 107 100 - 111 mmol/L    CO2 24 21 - 32 mmol/L    Anion gap 9 3.0 - 18 mmol/L    Glucose 99 74 - 99 mg/dL    BUN 13 7.0 - 18 MG/DL    Creatinine 0.61 0.6 - 1.3 MG/DL    BUN/Creatinine ratio 21 (H) 12 - 20      eGFR >60 >60 ml/min/1.73m2    Calcium 8.6 8.5 - 10.1 MG/DL    Bilirubin, total 0.4 0.2 - 1.0 MG/DL    ALT (SGPT) 12 (L) 13 - 56 U/L    AST (SGOT) 13 10 - 38 U/L    Alk. phosphatase 75 45 - 117 U/L    Protein, total 7.0 6.4 - 8.2 g/dL    Albumin 3.2 (L) 3.4 - 5.0 g/dL    Globulin 3.8 2.0 - 4.0 g/dL    A-G Ratio 0.8 0.8 - 1.7     URINALYSIS W/ RFLX MICROSCOPIC    Collection Time: 01/28/23 11:07 AM   Result Value Ref Range    Color YELLOW      Appearance CLEAR      Specific gravity 1.025 1.005 - 1.030      pH (UA) 6.0 5.0 - 8.0      Protein Negative NEG mg/dL    Glucose Negative NEG mg/dL    Ketone 80 (A) NEG mg/dL    Bilirubin Negative NEG      Blood Negative NEG      Urobilinogen 0.2 0.2 - 1.0 EU/dL    Nitrites Negative NEG      Leukocyte Esterase TRACE (A) NEG     COVID-19 WITH INFLUENZA A/B    Collection Time: 01/28/23 11:07 AM   Result Value Ref Range    SARS-CoV-2 by PCR Not detected NOTD      Influenza A by PCR Not detected NOTD      Influenza B by PCR Not detected NOTD     URINE MICROSCOPIC ONLY    Collection Time: 01/28/23 11:07 AM   Result Value Ref Range    WBC 0 to 3 0 - 5 /hpf    RBC 0 to 3 0 - 5 /hpf    Epithelial cells 2+ 0 - 5 /lpf    Bacteria 1+ (A) NEG /hpf    Mucus 2+ (A) NEG /lpf   WET PREP    Collection Time: 01/28/23 11:32 AM    Specimen: Vagina   Result Value Ref Range    Special Requests: NO SPECIAL REQUESTS      Wet prep NO YEAST,TRICHOMONAS OR CLUE CELLS NOTED          EKG:  When ordered, EKG's are interpreted by the Emergency Department Physician in the absence of a cardiologist.  Please see their note for interpretation of EKG. RADIOLOGY:  Non-plain film images such as CT, Ultrasound and MRI are read by the radiologist. Plain radiographic images are visualized and preliminarily interpreted by the ED Provider with the below findings:       Interpretation per the Radiologist below, if available at the time of this note:     Ashtyn Nolascos COMP    Result Date: 1/28/2023  EXAM:  US RETROPERITONEUM COMP INDICATION:  left flank pain, vomiting COMPARISON: None. TECHNIQUE: Real-time sonography of the kidneys, retroperitoneum and bladder was performed with multiple static images obtained. FINDINGS: The kidneys have normal echogenicity with no mass, stone or hydronephrosis. There is an incidental extra renal pelvis on the right. The right kidney measures 10.6 cm and the left kidney measures 11.9 cm in length. The aorta tapers normally. The proximal iliac arteries are normal.  The IVC is normal. No retroperitoneal mass is identified. The urinary bladder is incompletely distended. Incidental extrarenal pelvis on the right. Otherwise unremarkable. US OB > 14 WKS W DOPPLER    Result Date: 1/28/2023  EXAM:  US OB > 14 WKS W DOPPLER INDICATION:  pelvic pain COMPARISON: None. TECHNIQUE: Limited real time obstetric ultrasound was performed with multiple static images obtained. FINDINGS: There is a single intrauterine gestation. The estimated gestational age is 20 weeks 0 days based on measurement of biparietal diameter, head circumference, abdominal circumference, and femur length. Fetal cardiac activity is identified with a  fetal heart rate of 156 beats per minute. Fetal anatomic survey was not performed. The placenta is anterior. There is no placenta previa. Amniotic fluid volume is subjectively normal. Ovaries are normal in size and echotexture. The right ovary measures 4.3 x 3.7 x 2.2 and is within normal limits. The left ovary is not well-seen. There is a small area of subchorionic hemorrhage measuring 2.7 x 0.7 x 2.0 cm.     1. Single live 20 week 0 day intrauterine gestation. 2. Small focus of subchorionic hemorrhage. PROCEDURES   Unless otherwise noted below, none  Procedures     CRITICAL CARE TIME       EMERGENCY DEPARTMENT COURSE and DIFFERENTIAL DIAGNOSIS/MDM   Vitals:    Vitals:    01/28/23 1050 01/28/23 1455   BP: 114/66 117/72   Pulse: 99 86   Resp:  14   Temp: 98.1 °F (36.7 °C)    SpO2: 100% 100%   Weight: 52.6 kg (116 lb)    Height: 5' 6\" (1.676 m)         Patient was given the following medications:  Medications   ondansetron (ZOFRAN) injection 4 mg (4 mg IntraVENous Given 1/28/23 1106)   sodium chloride 0.9 % bolus infusion 1,000 mL (0 mL IntraVENous IV Completed 1/28/23 1455)   sodium chloride 0.9 % bolus infusion 1,000 mL (0 mL IntraVENous IV Completed 1/28/23 1300)   metoclopramide HCl (REGLAN) injection 5 mg (5 mg IntraVENous Given 1/28/23 1251)   diphenhydrAMINE (BENADRYL) injection 25 mg (25 mg IntraVENous Given 1/28/23 1251)   acetaminophen (TYLENOL) tablet 650 mg (650 mg Oral Given 1/28/23 1453)       CONSULTS: (Who and What was discussed)  None    Chronic Conditions: Anemia    Social Determinants affecting Dx or Tx: Patient lacks support at home or lives alone. Patient cannot afford medications. Patient does not have insurance. Records Reviewed (source and summary): Prior medical records and Previous Laboratory studies    CC/HPI Summary, DDx, ED Course, and Reassessment: 22-year-old female presenting to the emergency department who is 20 weeks pregnant with nausea and vomiting as well as back pain.   Differentials include viral gastroenteritis, PID, pyelonephritis, UTI, dehydration    ED Course as of 01/28/23 1521   Sat Jan 28, 2023   1249 12:49 PM  Patient resting comfortably on stretcher, complaining of nausea, will order Reglan and Benadryl [EC]   1520 3:20 PM  Patient is feeling better, she is tolerating p.o., no further episodes of vomiting. Stable for discharge. [EC]      ED Course User Index  [EC] Ainsley Murphy PA-C       Disposition Considerations (Tests not done, Shared Decision Making, Pt Expectation of Test or Tx.): CBC without any evidence of leukocytosis, there is no evidence of sepsis. OB ultrasound reveals an intrauterine pregnancy at 20 weeks gestation with a small subchorionic hemorrhage, patient has no vaginal bleeding. Urinalysis without any elevation of WBCs, retroperitoneal ultrasound reveals no hydronephrosis or stranding to suggest pyelonephritis. Suspect viral gastroenteritis. Wet prep is normal, GC chlamydia testing sent out. We will also send urine for culture. Patient was provided with 2 L of IV fluids and antinausea medication through the IV. After medications, and fluids, patient is tolerating p.o. She is afebrile. Lengthy discussion regarding close return precautions after speaking with ED attending regarding patient's symptoms. Advised to return to the emergency department immediately for fever, change in back pain, excessive vomiting, or vaginal bleeding. She voices understanding. Also advised to follow-up with her OB/GYN for screening lab tests and to schedule her fetal anatomy scan. She voices understanding of the discharge plan. FINAL IMPRESSION     1. Viral gastroenteritis          DISPOSITION/PLAN   Discharged    Discharge Note: The patient is stable for discharge home. The signs, symptoms, diagnosis, and discharge instructions have been discussed, understanding conveyed, and agreed upon. The patient is to follow up as recommended or return to ER should their symptoms worsen.       PATIENT REFERRED TO:  Follow-up Information       Follow up With Specialties Details Why Contact Info    ANISHA Garcia Physician Assistant Schedule an appointment as soon as possible for a visit  for follow up from ER visit [address] [phone]  [facility] [address]  Schedule an appointment as soon as possible for a visit  for follow up from ER visit [address]    [facility] EMERGENCY DEPT Emergency Medicine  As needed, If symptoms worsen [address]  [phone]              DISCHARGE MEDICATIONS:  Current Discharge Medication List        START taking these medications    Details   ondansetron (ZOFRAN ODT) 4 mg disintegrating tablet Take 1 Tablet by mouth every eight (8) hours as needed for Nausea or Vomiting. Qty: 20 Tablet, Refills: 0  Start date: 1/28/2023               DISCONTINUED MEDICATIONS:  Current Discharge Medication List          Shared Not Shared ANDRE: I have seen and evaluated the patient in conjunction with my supervising physician, Dr. Yumiko Bhatti. I am the Primary Clinician of Record. Shahbaz Jameson PA-C (electronically signed)    (Please note that parts of this dictation were completed with voice recognition software. Quite often unanticipated grammatical, syntax, homophones, and other interpretive errors are inadvertently transcribed by the computer software. Please disregards these errors.  Please excuse any errors that have escaped final proofreading.)

## 2023-01-30 LAB
BACTERIA SPEC CULT: NORMAL
C TRACH RRNA SPEC QL NAA+PROBE: NEGATIVE
N GONORRHOEA RRNA SPEC QL NAA+PROBE: NEGATIVE
SERVICE CMNT-IMP: NORMAL
SPECIMEN SOURCE: NORMAL

## (undated) DEVICE — INTENDED FOR TISSUE SEPARATION, AND OTHER PROCEDURES THAT REQUIRE A SHARP SURGICAL BLADE TO PUNCTURE OR CUT.: Brand: BARD-PARKER ® CARBON RIB-BACK BLADES

## (undated) DEVICE — STAPLER SKIN SQ 30 ABSRB STPL DISP INSORB

## (undated) DEVICE — STRAP,POSITIONING,KNEE/BODY,FOAM,4X60": Brand: MEDLINE

## (undated) DEVICE — REM POLYHESIVE ADULT PATIENT RETURN ELECTRODE: Brand: VALLEYLAB

## (undated) DEVICE — GARMENT,MEDLINE,DVT,INT,CALF,MED, GEN2: Brand: MEDLINE

## (undated) DEVICE — SOL IRRIGATION INJ NACL 0.9% 500ML BTL

## (undated) DEVICE — BSMI CSECTION BIRTHING PACK: Brand: MEDLINE INDUSTRIES, INC.

## (undated) DEVICE — SUT VCRL + 2-0 27IN SH UD --

## (undated) DEVICE — 3L THIN WALL CAN: Brand: CRD

## (undated) DEVICE — HEX-LOCKING BLADE ELECTRODE: Brand: EDGE

## (undated) DEVICE — SUT VCRL + 2-0 36IN CT1 UD --

## (undated) DEVICE — (D)SUT VCRL + 2-0 27IN SH UD -- DISK FRM MFR

## (undated) DEVICE — SUTURE VCRL SZ 0 L36IN ABSRB UD L36MM CT-1 1/2 CIR J946H

## (undated) DEVICE — SOLUTION IRRIG 1500ML STRL H2O USP POUR PLAS BTL

## (undated) DEVICE — SUT VCRL + 3-0 27IN SH VIO --